# Patient Record
Sex: FEMALE | Race: BLACK OR AFRICAN AMERICAN | NOT HISPANIC OR LATINO | Employment: FULL TIME | URBAN - METROPOLITAN AREA
[De-identification: names, ages, dates, MRNs, and addresses within clinical notes are randomized per-mention and may not be internally consistent; named-entity substitution may affect disease eponyms.]

---

## 2017-08-16 ENCOUNTER — HOSPITAL ENCOUNTER (EMERGENCY)
Facility: HOSPITAL | Age: 22
Discharge: HOME | End: 2017-08-16
Attending: EMERGENCY MEDICINE
Payer: MEDICAID

## 2017-08-16 VITALS
OXYGEN SATURATION: 99 % | HEART RATE: 79 BPM | DIASTOLIC BLOOD PRESSURE: 87 MMHG | BODY MASS INDEX: 27.31 KG/M2 | RESPIRATION RATE: 18 BRPM | WEIGHT: 160 LBS | SYSTOLIC BLOOD PRESSURE: 125 MMHG | HEIGHT: 64 IN | TEMPERATURE: 98 F

## 2017-08-16 DIAGNOSIS — L01.00 IMPETIGO: Primary | ICD-10-CM

## 2017-08-16 LAB
B-HCG UR QL: NEGATIVE
CTP QC/QA: YES

## 2017-08-16 PROCEDURE — 99283 EMERGENCY DEPT VISIT LOW MDM: CPT | Mod: 25

## 2017-08-16 PROCEDURE — 81025 URINE PREGNANCY TEST: CPT | Performed by: PHYSICIAN ASSISTANT

## 2017-08-16 PROCEDURE — 25000003 PHARM REV CODE 250: Performed by: PHYSICIAN ASSISTANT

## 2017-08-16 RX ORDER — MUPIROCIN 20 MG/G
1 OINTMENT TOPICAL
Status: COMPLETED | OUTPATIENT
Start: 2017-08-16 | End: 2017-08-16

## 2017-08-16 RX ORDER — SULFAMETHOXAZOLE AND TRIMETHOPRIM 800; 160 MG/1; MG/1
2 TABLET ORAL
Status: COMPLETED | OUTPATIENT
Start: 2017-08-16 | End: 2017-08-16

## 2017-08-16 RX ORDER — SULFAMETHOXAZOLE AND TRIMETHOPRIM 800; 160 MG/1; MG/1
2 TABLET ORAL 2 TIMES DAILY
Qty: 28 TABLET | Refills: 0 | Status: SHIPPED | OUTPATIENT
Start: 2017-08-16 | End: 2017-08-23

## 2017-08-16 RX ORDER — MUPIROCIN 20 MG/G
OINTMENT TOPICAL 3 TIMES DAILY
Qty: 15 G | Refills: 0 | Status: ON HOLD | OUTPATIENT
Start: 2017-08-16 | End: 2020-11-11 | Stop reason: HOSPADM

## 2017-08-16 RX ADMIN — SULFAMETHOXAZOLE AND TRIMETHOPRIM 2 TABLET: 800; 160 TABLET ORAL at 12:08

## 2017-08-16 RX ADMIN — MUPIROCIN 22 G: 20 OINTMENT TOPICAL at 12:08

## 2017-08-16 NOTE — ED PROVIDER NOTES
Encounter Date: 2017    SCRIBE #1 NOTE: I, Gonsalo Ellis, am scribing for, and in the presence of,  Tera Brown PA-C. I have scribed the following portions of the note - Other sections scribed: HPI, ROS.       History     Chief Complaint   Patient presents with    Rash     on 4th and 5th digit left hand..  patient is a  at a restaraunt.. starting to spread to 3rd digit also.. red, discolored     CC: Rash    23 y/o female with asthma and heart murmur of  presents to the ED c/o 2 month hx of acute onset rash to 3rd and 4th digit of L hand with associated pain. The symptoms have progressively worsened. The patient states that she first noticed the symptoms to the 4th digit of L hand; however, it has recently spread to the 3rd digit of L hand. The patient states that she was wearing a ring on her 4th digit, but she removed it. The patient also reports working as a . The patient denies SOB, chest pain, fever, or chills. The patient attempted lotion with no relief. No alleviating factors or other symptoms reported.      The history is provided by the patient. No  was used.     Review of patient's allergies indicates:   Allergen Reactions    Iodine and iodide containing products Rash     Past Medical History:   Diagnosis Date    Asthma     Heart murmur of       Past Surgical History:   Procedure Laterality Date    TONSILLECTOMY      partial tionsillectomy     Family History   Problem Relation Age of Onset    Breast cancer Neg Hx     Colon cancer Neg Hx     Ovarian cancer Neg Hx      Social History   Substance Use Topics    Smoking status: Never Smoker    Smokeless tobacco: Never Used    Alcohol use Yes      Comment: ocassionally     Review of Systems   Constitutional: Negative for chills and fever.   HENT: Negative for rhinorrhea.    Eyes: Negative for redness.   Respiratory: Negative for cough and shortness of breath.    Cardiovascular: Negative for  chest pain.   Gastrointestinal: Negative for abdominal pain, diarrhea, nausea and vomiting.   Genitourinary: Negative for difficulty urinating and dysuria.   Musculoskeletal: Negative for back pain.   Skin: Positive for rash (to 3rd and 4th digit of L hand with associated pain).   Neurological: Negative for headaches.       Physical Exam     Initial Vitals [08/16/17 1116]   BP Pulse Resp Temp SpO2   124/73 76 16 97.9 °F (36.6 °C) --      MAP       90         Physical Exam    Nursing note and vitals reviewed.  Constitutional: She appears well-developed and well-nourished. She is not diaphoretic. No distress.   HENT:   Head: Normocephalic and atraumatic.   Eyes: Conjunctivae and EOM are normal. Pupils are equal, round, and reactive to light.   Neck: Normal range of motion. Neck supple. No tracheal deviation present.   Cardiovascular: Normal heart sounds.   Pulmonary/Chest: Breath sounds normal. No stridor. No respiratory distress.   Abdominal: Soft. Bowel sounds are normal. She exhibits no distension. There is no tenderness.   Musculoskeletal: Normal range of motion.        Hands:  Lymphadenopathy:     She has no cervical adenopathy.   Neurological: She is alert and oriented to person, place, and time.   Skin: Skin is warm and dry. Capillary refill takes less than 2 seconds.   Psychiatric: She has a normal mood and affect. Her behavior is normal. Judgment and thought content normal.         ED Course   Procedures  Labs Reviewed   POCT URINE PREGNANCY             Medical Decision Making:   Initial Assessment:   22-year-old female chief complaint rash to hand ×2 months.  Differential Diagnosis:   Impetigo, ALLERGIC reaction, herpes  ED Management:  Patient overall well-appearing, in no acute distress, afebrile, vitals within normal limits.    Patient states initially rash began on left fourth digit.  She states she suspects is related to a ring that she's been wearing.  She has continued to weathering, and also notes  that the rash is began to spread to third and fifth digits as well.  On exam, there is evidence of crusting, erythema, and tenderness to palpation.  Patient does state that she has various ALLERGIES to lotions, lipsticks, topical products.  I do think she may have some sort of ALLERGY to the ring that she's been wearing.  I've asked her to discontinue wearing ring.  I do suspect what may have an ALLERGY, has now progressed into bacterial infection.  I will discharge with Bactroban topically, in addition to Bactrim by mouth prescription.  I've asked her to follow with her primary care physician for reevaluation of symptoms, and return this ED if any problems occur.  She does understand and agree with treatment plan.   Other:   I have discussed this case with another health care provider.       <> Summary of the Discussion: I have discussed this case with Dr. Gallegos.            Scribe Attestation:   Scribe #1: I performed the above scribed service and the documentation accurately describes the services I performed. I attest to the accuracy of the note.    Attending Attestation:           Physician Attestation for Scribe:  Physician Attestation Statement for Scribe #1: I, Tera Brown PA-C, reviewed documentation, as scribed by Gonsalo Ellis in my presence, and it is both accurate and complete.                 ED Course     Clinical Impression:   The encounter diagnosis was Impetigo.    Disposition:   Disposition: Discharged  Condition: Stable                        Tera Herman PA-C  08/16/17 1315

## 2017-08-16 NOTE — ED TRIAGE NOTES
Rash: Patient complains of rash involving the left hand. Rash started 2 months ago. Appearance of rash at onset: Color of lesion(s): pink. Rash has changed over time Initial distribution: ring finger.  Discomfort associated with rash: causes no discomfort.  Associated symptoms: none. Denies: cough, fever, nausea, sore throat and vomiting. Patient has not had previous evaluation of rash. Patient has not had previous treatment.  Response to treatment: lotion. Patient has not had contacts with similar rash. Patient has not identified precipitant. Patient has not had new exposures (soaps, lotions, laundry detergents, foods, medications, plants, insects or animals.)

## 2017-08-16 NOTE — DISCHARGE INSTRUCTIONS
Take antibiotics as prescribed.  Use ointment inside naris and on wound 3 times daily.  Follow-up with primary care physician for reevaluation.  Return to this ED if any problems occur.

## 2019-12-02 ENCOUNTER — HOSPITAL ENCOUNTER (EMERGENCY)
Facility: HOSPITAL | Age: 24
Discharge: HOME | End: 2019-12-02
Attending: EMERGENCY MEDICINE

## 2019-12-02 VITALS
OXYGEN SATURATION: 98 % | TEMPERATURE: 99 F | BODY MASS INDEX: 30.12 KG/M2 | HEIGHT: 63 IN | SYSTOLIC BLOOD PRESSURE: 139 MMHG | DIASTOLIC BLOOD PRESSURE: 85 MMHG | WEIGHT: 170 LBS | RESPIRATION RATE: 18 BRPM | HEART RATE: 73 BPM

## 2019-12-02 DIAGNOSIS — R10.9 ABDOMINAL CRAMPING: Primary | ICD-10-CM

## 2019-12-02 LAB
B-HCG UR QL: NEGATIVE
BACTERIA #/AREA URNS HPF: ABNORMAL /HPF
BILIRUB UR QL STRIP: NEGATIVE
CLARITY UR: CLEAR
COLOR UR: YELLOW
CTP QC/QA: YES
GLUCOSE UR QL STRIP: NEGATIVE
HGB UR QL STRIP: ABNORMAL
HYALINE CASTS #/AREA URNS LPF: 1 /LPF
KETONES UR QL STRIP: NEGATIVE
LEUKOCYTE ESTERASE UR QL STRIP: NEGATIVE
MICROSCOPIC COMMENT: ABNORMAL
NITRITE UR QL STRIP: NEGATIVE
PH UR STRIP: 5 [PH] (ref 5–8)
PROT UR QL STRIP: ABNORMAL
RBC #/AREA URNS HPF: 15 /HPF (ref 0–4)
SP GR UR STRIP: >1.03 (ref 1–1.03)
URN SPEC COLLECT METH UR: ABNORMAL
UROBILINOGEN UR STRIP-ACNC: ABNORMAL EU/DL
WBC #/AREA URNS HPF: 3 /HPF (ref 0–5)

## 2019-12-02 PROCEDURE — 87186 SC STD MICRODIL/AGAR DIL: CPT

## 2019-12-02 PROCEDURE — 87086 URINE CULTURE/COLONY COUNT: CPT

## 2019-12-02 PROCEDURE — 87088 URINE BACTERIA CULTURE: CPT

## 2019-12-02 PROCEDURE — 81000 URINALYSIS NONAUTO W/SCOPE: CPT

## 2019-12-02 PROCEDURE — 81025 URINE PREGNANCY TEST: CPT | Performed by: NURSE PRACTITIONER

## 2019-12-02 PROCEDURE — 87077 CULTURE AEROBIC IDENTIFY: CPT

## 2019-12-02 PROCEDURE — 25000003 PHARM REV CODE 250: Performed by: PHYSICIAN ASSISTANT

## 2019-12-02 PROCEDURE — 99283 EMERGENCY DEPT VISIT LOW MDM: CPT | Mod: 25

## 2019-12-02 PROCEDURE — 87491 CHLMYD TRACH DNA AMP PROBE: CPT

## 2019-12-02 RX ORDER — IBUPROFEN 600 MG/1
600 TABLET ORAL
Status: COMPLETED | OUTPATIENT
Start: 2019-12-02 | End: 2019-12-02

## 2019-12-02 RX ORDER — IBUPROFEN 600 MG/1
600 TABLET ORAL EVERY 6 HOURS PRN
Qty: 20 TABLET | Refills: 0 | Status: ON HOLD | OUTPATIENT
Start: 2019-12-02 | End: 2020-11-11 | Stop reason: SDUPTHER

## 2019-12-02 RX ADMIN — IBUPROFEN 600 MG: 600 TABLET, FILM COATED ORAL at 09:12

## 2019-12-02 NOTE — LETTER
12/07/2019      Zaheer Serna  [address]  [place] [address]      Visit Date: 12/2/2019        Multiple attempts to contact you via the provided telephone have been unsuccessful. This written correspondence is to inform you that your test results from your recent visit to [facility] have abnormal results. Please return to the Emergency Department immediately or call [phone] and ask to speak with a provider for further information.        Thank you,  Emergency Department Staff  [facility] [address]  ?  [place] [address]  ?  phone [phone] ?   ?  fax [phone]  ?  [email or web address]

## 2019-12-03 LAB
C TRACH DNA SPEC QL NAA+PROBE: NOT DETECTED
N GONORRHOEA DNA SPEC QL NAA+PROBE: NOT DETECTED

## 2019-12-03 NOTE — ED PROVIDER NOTES
Encounter Date: 2019    SCRIBE #1 NOTE: I, Barb Peña, am scribing for, and in the presence of,  Mitchell Nguyen PA-C. I have scribed the following portions of the note - Other sections scribed: HPI, ROS, and PE.       History     Chief Complaint   Patient presents with    Abdominal Cramping     Pt here with c/o lower abdominal cramping x a few days, Pt reports last menstral cycle 10/25/19. Pt did not take UPT at home.     CC: Abdominal Pain    HPI:  This is a 24 y.o. female with a PMHx of Asthma. She presents to the Emergency Department with a cc of cramping, suprapubic, abdominal pain x4 days. The patient states that her menstrual period is late; she has a Hx of irregular periods but states this one is more irregular than usual. Now in the ED she is asymptomatic. She denies any vaginal bleeding, vaginal discharge, dysuria, or fever. There were no alleviating or worsening factors reported. Patient reports no prior history of similar symptoms. She is allergic to Iodine. Patient has no concern for STD/STI.        The history is provided by the patient.     Review of patient's allergies indicates:   Allergen Reactions    Iodine and iodide containing products Rash     Past Medical History:   Diagnosis Date    Asthma     Heart murmur of       Past Surgical History:   Procedure Laterality Date    TONSILLECTOMY      partial tionsillectomy     Family History   Problem Relation Age of Onset    Breast cancer Neg Hx     Colon cancer Neg Hx     Ovarian cancer Neg Hx      Social History     Tobacco Use    Smoking status: Never Smoker    Smokeless tobacco: Never Used   Substance Use Topics    Alcohol use: Yes     Comment: patient drinks every day sara and daqauri    Drug use: Yes     Types: Marijuana     Review of Systems   Constitutional: Negative for chills and fever.   HENT: Negative for congestion and sore throat.    Eyes: Negative for visual disturbance.   Respiratory: Negative for cough and  shortness of breath.    Cardiovascular: Negative for chest pain.   Gastrointestinal: Positive for abdominal pain. Negative for diarrhea, nausea and vomiting.   Genitourinary: Negative for dysuria, hematuria, vaginal bleeding and vaginal discharge.   Musculoskeletal: Negative for back pain and neck pain.   Skin: Negative for rash.   Neurological: Negative for headaches.   Psychiatric/Behavioral: Negative for confusion.       Physical Exam     Initial Vitals [12/02/19 1916]   BP Pulse Resp Temp SpO2   (!) 149/92 76 18 98.3 °F (36.8 °C) 99 %      MAP       --         Physical Exam    Nursing note and vitals reviewed.  Constitutional: She appears well-developed and well-nourished. She is not diaphoretic. No distress.   HENT:   Head: Normocephalic.   Mouth/Throat: Oropharynx is clear and moist. No oropharyngeal exudate.   Eyes: Conjunctivae and EOM are normal. Pupils are equal, round, and reactive to light.   Neck: Normal range of motion. Neck supple.   Cardiovascular: Normal rate, regular rhythm and normal heart sounds.   Pulmonary/Chest: No respiratory distress.   Abdominal: Soft. Bowel sounds are normal. She exhibits no distension. There is no tenderness. There is no rebound and no guarding.   Musculoskeletal: Normal range of motion.   Neurological: She is alert and oriented to person, place, and time. She has normal strength and normal reflexes. GCS score is 15. GCS eye subscore is 4. GCS verbal subscore is 5. GCS motor subscore is 6.   Skin: Skin is warm and dry. Capillary refill takes less than 2 seconds.   Psychiatric: She has a normal mood and affect.         ED Course   Procedures  Labs Reviewed   URINALYSIS, REFLEX TO URINE CULTURE - Abnormal; Notable for the following components:       Result Value    Specific Gravity, UA >1.030 (*)     Protein, UA 1+ (*)     Occult Blood UA 3+ (*)     Urobilinogen, UA 4.0-6.0 (*)     All other components within normal limits    Narrative:     Preferred Collection Type->Urine,  Clean Catch   URINALYSIS MICROSCOPIC - Abnormal; Notable for the following components:    RBC, UA 15 (*)     Bacteria Moderate (*)     All other components within normal limits    Narrative:     Preferred Collection Type->Urine, Clean Catch   CULTURE, URINE   C. TRACHOMATIS/N. GONORRHOEAE BY AMP DNA   POCT URINE PREGNANCY          Imaging Results    None          Medical Decision Making:   ED Management:  24-year-old female with intermittent lower abdominal cramping and concern for pregnancy.  UPT is negative. Abdomen soft and nontender. Very low suspicion for PID or ectopic pregnancy.  UA without convincing evidence for infection.  Will culture.  Patient treated with ibuprofen and discharged with return precautions and follow-up instructions.            Scribe Attestation:   Scribe #1: I performed the above scribed service and the documentation accurately describes the services I performed. I attest to the accuracy of the note.                          Clinical Impression:     1. Abdominal cramping          Disposition:   Disposition: Discharged  Condition: Stable        Scribe attestation: I, Mitchell Nguyen, personally performed the services described in this documentation. All medical record entries made by the scribe were at my direction and in my presence.  I have reviewed the chart and agree that the record reflects my personal performance and is accurate and complete.               Mitchell Nguyen PA-C  12/02/19 4440

## 2019-12-04 LAB — BACTERIA UR CULT: ABNORMAL

## 2020-02-08 ENCOUNTER — HOSPITAL ENCOUNTER (EMERGENCY)
Facility: HOSPITAL | Age: 25
Discharge: HOME | End: 2020-02-08
Attending: EMERGENCY MEDICINE
Payer: MEDICAID

## 2020-02-08 VITALS
HEIGHT: 64 IN | TEMPERATURE: 98 F | HEART RATE: 82 BPM | DIASTOLIC BLOOD PRESSURE: 80 MMHG | RESPIRATION RATE: 18 BRPM | BODY MASS INDEX: 29.02 KG/M2 | OXYGEN SATURATION: 98 % | SYSTOLIC BLOOD PRESSURE: 127 MMHG | WEIGHT: 170 LBS

## 2020-02-08 DIAGNOSIS — R30.0 DYSURIA: Primary | ICD-10-CM

## 2020-02-08 DIAGNOSIS — N30.01 ACUTE CYSTITIS WITH HEMATURIA: ICD-10-CM

## 2020-02-08 LAB
B-HCG UR QL: NEGATIVE
BACTERIA #/AREA URNS HPF: ABNORMAL /HPF
BACTERIA GENITAL QL WET PREP: ABNORMAL
BILIRUB UR QL STRIP: NEGATIVE
CLARITY UR: ABNORMAL
CLUE CELLS VAG QL WET PREP: ABNORMAL
COLOR UR: YELLOW
CTP QC/QA: YES
FILAMENT FUNGI VAG WET PREP-#/AREA: ABNORMAL
GLUCOSE UR QL STRIP: NEGATIVE
HGB UR QL STRIP: ABNORMAL
HYALINE CASTS #/AREA URNS LPF: 0 /LPF
KETONES UR QL STRIP: NEGATIVE
LEUKOCYTE ESTERASE UR QL STRIP: NEGATIVE
MICROSCOPIC COMMENT: ABNORMAL
NITRITE UR QL STRIP: NEGATIVE
PH UR STRIP: 6 [PH] (ref 5–8)
PROT UR QL STRIP: ABNORMAL
RBC #/AREA URNS HPF: >100 /HPF (ref 0–4)
SP GR UR STRIP: 1.02 (ref 1–1.03)
SPECIMEN SOURCE: ABNORMAL
SQUAMOUS #/AREA URNS HPF: 10 /HPF
T VAGINALIS GENITAL QL WET PREP: ABNORMAL
URN SPEC COLLECT METH UR: ABNORMAL
UROBILINOGEN UR STRIP-ACNC: NEGATIVE EU/DL
WBC #/AREA URNS HPF: 2 /HPF (ref 0–5)
WBC #/AREA VAG WET PREP: ABNORMAL
YEAST GENITAL QL WET PREP: ABNORMAL

## 2020-02-08 PROCEDURE — 99283 EMERGENCY DEPT VISIT LOW MDM: CPT

## 2020-02-08 PROCEDURE — 87210 SMEAR WET MOUNT SALINE/INK: CPT

## 2020-02-08 PROCEDURE — 81000 URINALYSIS NONAUTO W/SCOPE: CPT

## 2020-02-08 PROCEDURE — 87591 N.GONORRHOEAE DNA AMP PROB: CPT

## 2020-02-08 PROCEDURE — 81025 URINE PREGNANCY TEST: CPT | Performed by: PHYSICIAN ASSISTANT

## 2020-02-08 RX ORDER — CEPHALEXIN 500 MG/1
500 CAPSULE ORAL 2 TIMES DAILY
Qty: 10 CAPSULE | Refills: 0 | Status: SHIPPED | OUTPATIENT
Start: 2020-02-08 | End: 2020-02-13

## 2020-02-08 NOTE — DISCHARGE INSTRUCTIONS
Take Keflex twice daily for 5 days for treatment of UTI.  Follow-up with your OBGYN if symptoms do not improve.  Return to the ED for any concerning symptoms.    Our goal in the emergency department is to always give you outstanding care and exceptional service. You may receive a survey by mail or e-mail in the next week regarding your experience in our ED. We would greatly appreciate your completing and returning the survey. Your feedback provides us with a way to recognize our staff who give very good care and it helps us learn how to improve when your experience was below our aspiration of excellence.

## 2020-02-08 NOTE — ED TRIAGE NOTES
"Pt. Reports abd pain and lower back pain. Pt. States she has " pressure" with urinating and feels like she is not emptying her bladder. Pt. Denies any concerns for STD or vaginal discharge.   "

## 2020-02-08 NOTE — ED PROVIDER NOTES
Encounter Date: 2020    SCRIBE #1 NOTE: I, Marcela Zaldivar, am scribing for, and in the presence of,  Cynthia Prabhakar PA-C. I have scribed the following portions of the note - Other sections scribed: HPI, ROS, PE.       History     Chief Complaint   Patient presents with    Abdominal Pain     x wk lower back and lower abd pain, frequency; denies dysuria,      This is a 24 y.o. female with no pertinent PMHx who presents to the Emergency Department with a cc of constant lower abdominal pain x1 week. Pt reports associated back pain, frequency, decrease in appetite, and hematuria (resolved). Pt denies any emesis, diarrhea, or vaginal discharge. Patient states that she is currently on her menstrual cycle. She states she would like to be screened for STDs.    The history is provided by the patient. No  was used.     Review of patient's allergies indicates:   Allergen Reactions    Iodine and iodide containing products Rash     Past Medical History:   Diagnosis Date    Asthma     Heart murmur of       Past Surgical History:   Procedure Laterality Date    TONSILLECTOMY      partial tionsillectomy     Family History   Problem Relation Age of Onset    Breast cancer Neg Hx     Colon cancer Neg Hx     Ovarian cancer Neg Hx      Social History     Tobacco Use    Smoking status: Never Smoker    Smokeless tobacco: Never Used   Substance Use Topics    Alcohol use: Yes     Comment: patient drinks every day sara and daqauri    Drug use: Yes     Types: Marijuana     Review of Systems   Constitutional: Positive for appetite change. Negative for chills and fever.   HENT: Negative for sore throat.    Eyes: Negative for visual disturbance.   Respiratory: Positive for shortness of breath.    Cardiovascular: Positive for chest pain.   Gastrointestinal: Positive for abdominal pain. Negative for diarrhea, nausea and vomiting.   Genitourinary: Positive for frequency and hematuria (resolved).  Negative for dysuria, vaginal bleeding and vaginal discharge.   Musculoskeletal: Positive for back pain. Negative for neck pain.   Skin: Negative for rash.   Allergic/Immunologic: Negative for immunocompromised state.   Neurological: Negative for headaches.   Psychiatric/Behavioral: Negative for dysphoric mood.       Physical Exam     Initial Vitals [02/08/20 1517]   BP Pulse Resp Temp SpO2   (!) 141/94 79 18 98.3 °F (36.8 °C) 100 %      MAP       --         Physical Exam    Nursing note and vitals reviewed.  Constitutional: She appears well-developed and well-nourished. She is not diaphoretic. No distress.   HENT:   Head: Normocephalic and atraumatic.   Mouth/Throat: Oropharynx is clear and moist. No oropharyngeal exudate.   Eyes: Conjunctivae and EOM are normal. Pupils are equal, round, and reactive to light.   Neck: Normal range of motion. Neck supple.   Cardiovascular: Normal rate, regular rhythm, normal heart sounds and intact distal pulses.   Pulmonary/Chest: Breath sounds normal. No respiratory distress. She has no wheezes. She has no rhonchi. She has no rales.   Abdominal: Soft. Bowel sounds are normal. There is tenderness. There is no rebound and no guarding.   Abdomen soft, nondistended, with mild TTP to the suprapubic region.   Genitourinary:   Genitourinary Comments: Pelvic exam (RN chaperoned):  Normal external genitalia.  Normal vaginal rugae with bright red blood noted to the posterior vault.  Unable to fully visualize the cervix.  No abnormal discharge noted. Cervix pink and nonfriable.  No CMT or adnexal tenderness.   Musculoskeletal: Normal range of motion.   Lymphadenopathy:     She has no cervical adenopathy.   Neurological: She is alert and oriented to person, place, and time. GCS score is 15. GCS eye subscore is 4. GCS verbal subscore is 5. GCS motor subscore is 6.   Skin: Skin is warm and dry. Capillary refill takes less than 2 seconds.   Psychiatric: She has a normal mood and affect. Thought  content normal.         ED Course   Procedures  Labs Reviewed   URINALYSIS, REFLEX TO URINE CULTURE - Abnormal; Notable for the following components:       Result Value    Appearance, UA Hazy (*)     Protein, UA 2+ (*)     Occult Blood UA 3+ (*)     All other components within normal limits    Narrative:     Preferred Collection Type->Urine, Clean Catch   VAGINAL SCREEN - Abnormal; Notable for the following components:    WBC - Vaginal Screen Few (*)     Bacteria - Vaginal Screen Few (*)     All other components within normal limits   URINALYSIS MICROSCOPIC - Abnormal; Notable for the following components:    RBC, UA >100 (*)     Bacteria Few (*)     All other components within normal limits    Narrative:     Preferred Collection Type->Urine, Clean Catch   C. TRACHOMATIS/N. GONORRHOEAE BY AMP DNA   POCT URINE PREGNANCY          Imaging Results    None                APC / Resident Notes:   This is an evaluation of a 24 y.o. female with asthma that presents to the Emergency Department for dysuria. The patient is a non-toxic, afebrile, and well appearing female. On physical exam, there is mild suprapubic TTP, no CVAT, pelvic exam largely unremarkable; she has no abdominal tenderness, distention, peritoneal signs, or CVA tenderness.     Vital Signs: WNL  Lab\Radiology\Other Procedure RESULTS: UA with proteinuria, 3+ OB, neg nitrites/leuks, >100 RBCs, few bacteria; Urine Culture is pending. Wet prep with few bacteria.    Given the above findings, my overall impression is UTI. Given the above findings, I do not think the patient has pyelonephritis or STI .    Upon chart review, patient's urine culture from 12/2019 pan-sensitive. The patient will be discharged home with Keflex. The diagnosis, treatment plan, instructions for follow-up and reevaluation with her PCP as well as ED return precautions have been discussed with the patient and she has verbalized an understanding of the information. All questions or concerns from  the patient have been addressed.        Scribe Attestation:   Scribe #1: I performed the above scribed service and the documentation accurately describes the services I performed. I attest to the accuracy of the note.                          Clinical Impression:       ICD-10-CM ICD-9-CM   1. Dysuria R30.0 788.1   2. Acute cystitis with hematuria N30.01 595.0         Disposition:   Disposition: Discharged  Condition: Stable    Scribe attestation: I, Cynthia Prabhakar, personally performed the services described in this documentation. All medical record entries made by the scribe were at my direction and in my presence.  I have reviewed the chart and agree that the record reflects my personal performance and is accurate and complete.                   Cynthia Prabhakar PA-C  02/08/20 8870

## 2020-02-10 LAB
C TRACH DNA SPEC QL NAA+PROBE: NOT DETECTED
N GONORRHOEA DNA SPEC QL NAA+PROBE: NOT DETECTED

## 2020-09-21 ENCOUNTER — HOSPITAL ENCOUNTER (OUTPATIENT)
Dept: OBSTETRICS AND GYNECOLOGY | Facility: HOSPITAL | Age: 25
Discharge: HOME | End: 2020-09-21
Attending: OBSTETRICS & GYNECOLOGY
Payer: MEDICAID

## 2020-09-21 DIAGNOSIS — Z34.82 PRENATAL CARE, SUBSEQUENT PREGNANCY, SECOND TRIMESTER: ICD-10-CM

## 2020-09-21 LAB — INJECT RH IG VOL PATIENT: NORMAL ML

## 2020-09-21 PROCEDURE — 63600519 RHOGAM PHARM REV CODE 636 ALT 250 W HCPCS: Performed by: OBSTETRICS & GYNECOLOGY

## 2020-09-21 RX ADMIN — HUMAN RHO(D) IMMUNE GLOBULIN 300 MCG: 300 INJECTION, SOLUTION INTRAMUSCULAR at 05:09

## 2020-10-31 ENCOUNTER — HOSPITAL ENCOUNTER (OUTPATIENT)
Facility: HOSPITAL | Age: 25
Discharge: HOME | End: 2020-10-31
Attending: OBSTETRICS & GYNECOLOGY | Admitting: OBSTETRICS & GYNECOLOGY
Payer: MEDICAID

## 2020-10-31 VITALS
SYSTOLIC BLOOD PRESSURE: 131 MMHG | RESPIRATION RATE: 18 BRPM | TEMPERATURE: 98 F | HEART RATE: 79 BPM | OXYGEN SATURATION: 98 % | DIASTOLIC BLOOD PRESSURE: 86 MMHG

## 2020-10-31 DIAGNOSIS — R10.9 ABDOMINAL PAIN: ICD-10-CM

## 2020-10-31 LAB
BACTERIA #/AREA URNS HPF: ABNORMAL /HPF
BILIRUB UR QL STRIP: NEGATIVE
CLARITY UR: ABNORMAL
COLOR UR: ABNORMAL
GLUCOSE UR QL STRIP: NEGATIVE
HGB UR QL STRIP: NEGATIVE
HYALINE CASTS #/AREA URNS LPF: ABNORMAL /LPF
KETONES UR QL STRIP: ABNORMAL
LEUKOCYTE ESTERASE UR QL STRIP: ABNORMAL
MICROSCOPIC COMMENT: ABNORMAL
NITRITE UR QL STRIP: NEGATIVE
PH UR STRIP: 6 [PH] (ref 5–8)
PROT UR QL STRIP: ABNORMAL
RBC #/AREA URNS HPF: 0 /HPF (ref 0–4)
SP GR UR STRIP: 1.02 (ref 1–1.03)
SQUAMOUS #/AREA URNS HPF: ABNORMAL /HPF
URN SPEC COLLECT METH UR: ABNORMAL
UROBILINOGEN UR STRIP-ACNC: ABNORMAL EU/DL
WBC #/AREA URNS HPF: 15 /HPF (ref 0–5)

## 2020-10-31 PROCEDURE — 81000 URINALYSIS NONAUTO W/SCOPE: CPT

## 2020-10-31 PROCEDURE — 59025 FETAL NON-STRESS TEST: CPT

## 2020-10-31 PROCEDURE — 99211 OFF/OP EST MAY X REQ PHY/QHP: CPT | Mod: 25

## 2020-11-01 NOTE — DISCHARGE INSTRUCTIONS
Please  call Labor and delivery with questions or concerns at [phone] and keep your schedules appointment.

## 2020-11-01 NOTE — PROGRESS NOTES
Spoke with Dr Stewart at this time regarding patient's c/o vaginal pressure and cramping and informed him about her reassuring strip and the results of her urinalysis received orders to discharge patient home.

## 2020-11-01 NOTE — NURSING
Patient present to L&D with a complaint of cramping and vaginal pressure that began last night after having sex.  Patient denies vaginal bleeding or ROM.  Upon SVE assessment patient is 2 cm dilated, very posterior and thick.  No bleeding noted after SVE.   Patient placed on EFM and given po hydration at this time.  Clean catch urine collected and sent to lab.

## 2020-11-09 NOTE — H&P (VIEW-ONLY)
25 y.o.  presents for pre-op H&P for IOL for elective purposes.  Patient's last menstrual period was 2020..  Patient will be 39 weeks on date of IOL.  Cervix is very favorable.     Past Medical History:   Diagnosis Date    Asthma     Heart murmur of       Past Surgical History:   Procedure Laterality Date    TONSILLECTOMY      partial tionsillectomy     Family History   Problem Relation Age of Onset    Breast cancer Neg Hx     Colon cancer Neg Hx     Ovarian cancer Neg Hx      Review of patient's allergies indicates:   Allergen Reactions    Iodine and iodide containing products Rash       Current Outpatient Medications:     ibuprofen (ADVIL,MOTRIN) 600 MG tablet, Take 1 tablet (600 mg total) by mouth every 6 (six) hours as needed for Pain., Disp: 20 tablet, Rfl: 0    mupirocin (BACTROBAN) 2 % ointment, Apply topically 3 (three) times daily. Apply to wound and inside nares TID., Disp: 15 g, Rfl: 0  Social History     Socioeconomic History    Marital status: Single     Spouse name: Not on file    Number of children: Not on file    Years of education: Not on file    Highest education level: Not on file   Occupational History    Not on file   Social Needs    Financial resource strain: Not on file    Food insecurity     Worry: Not on file     Inability: Not on file    Transportation needs     Medical: Not on file     Non-medical: Not on file   Tobacco Use    Smoking status: Never Smoker    Smokeless tobacco: Never Used   Substance and Sexual Activity    Alcohol use: Yes     Comment: patient drinks every day sara and daqauri    Drug use: Yes     Types: Marijuana    Sexual activity: Yes   Lifestyle    Physical activity     Days per week: Not on file     Minutes per session: Not on file    Stress: Not on file   Relationships    Social connections     Talks on phone: Not on file     Gets together: Not on file     Attends Church service: Not on file     Active member of  club or organization: Not on file     Attends meetings of clubs or organizations: Not on file     Relationship status: Not on file   Other Topics Concern    Not on file   Social History Narrative    Not on file         There were no vitals filed for this visit.  General Appearance: Alert, appropriate appearance for age. No acute distress, Chest/Respiratory Exam: normal, CTA  Cardiovascular Exam: RRR  Gastrointestinal Exam: soft, NT/ND  Pelvic Exam Female: 4/80/-2 VTX   Psychiatric Exam: Alert and oriented, appropriate affect.    Assessment: IUP at 39 weeks / IOL / GBS+  Plan: pitocin for IOL.  Ampicillin  I have discussed the risks, benefits, indications, and alternatives of the procedure in detail.  The patient verbalizes her understanding.  All questions answered.  Consents signed.  The patient agrees to proceed to proceed as planned.    Fritz Stewart MD

## 2020-11-10 ENCOUNTER — HOSPITAL ENCOUNTER (INPATIENT)
Facility: HOSPITAL | Age: 25
LOS: 2 days | Discharge: HOME | End: 2020-11-12
Attending: OBSTETRICS & GYNECOLOGY | Admitting: OBSTETRICS & GYNECOLOGY
Payer: MEDICAID

## 2020-11-10 ENCOUNTER — ANESTHESIA EVENT (OUTPATIENT)
Dept: OBSTETRICS AND GYNECOLOGY | Facility: HOSPITAL | Age: 25
End: 2020-11-10
Payer: MEDICAID

## 2020-11-10 ENCOUNTER — ANESTHESIA (OUTPATIENT)
Dept: OBSTETRICS AND GYNECOLOGY | Facility: HOSPITAL | Age: 25
End: 2020-11-10
Payer: MEDICAID

## 2020-11-10 DIAGNOSIS — O26.893 RH NEGATIVE STATUS DURING PREGNANCY IN THIRD TRIMESTER: ICD-10-CM

## 2020-11-10 DIAGNOSIS — Z34.90 ENCOUNTER FOR ELECTIVE INDUCTION OF LABOR: ICD-10-CM

## 2020-11-10 DIAGNOSIS — Z67.91 RH NEGATIVE STATUS DURING PREGNANCY IN THIRD TRIMESTER: ICD-10-CM

## 2020-11-10 DIAGNOSIS — Z34.90 PREGNANCY: ICD-10-CM

## 2020-11-10 LAB
ABO + RH BLD: NORMAL
BASOPHILS # BLD AUTO: 0.02 K/UL (ref 0–0.2)
BASOPHILS NFR BLD: 0.3 % (ref 0–1.9)
BLD GP AB SCN CELLS X3 SERPL QL: NORMAL
BLOOD GROUP ANTIBODIES SERPL: NORMAL
DIFFERENTIAL METHOD: ABNORMAL
EOSINOPHIL # BLD AUTO: 0.1 K/UL (ref 0–0.5)
EOSINOPHIL NFR BLD: 1 % (ref 0–8)
ERYTHROCYTE [DISTWIDTH] IN BLOOD BY AUTOMATED COUNT: 13.2 % (ref 11.5–14.5)
HCT VFR BLD AUTO: 35.9 % (ref 37–48.5)
HGB BLD-MCNC: 11.8 G/DL (ref 12–16)
IMM GRANULOCYTES # BLD AUTO: 0.02 K/UL (ref 0–0.04)
IMM GRANULOCYTES NFR BLD AUTO: 0.3 % (ref 0–0.5)
LYMPHOCYTES # BLD AUTO: 1.8 K/UL (ref 1–4.8)
LYMPHOCYTES NFR BLD: 25.8 % (ref 18–48)
MCH RBC QN AUTO: 29.9 PG (ref 27–31)
MCHC RBC AUTO-ENTMCNC: 32.9 G/DL (ref 32–36)
MCV RBC AUTO: 91 FL (ref 82–98)
MONOCYTES # BLD AUTO: 0.6 K/UL (ref 0.3–1)
MONOCYTES NFR BLD: 8 % (ref 4–15)
NEUTROPHILS # BLD AUTO: 4.5 K/UL (ref 1.8–7.7)
NEUTROPHILS NFR BLD: 64.6 % (ref 38–73)
NRBC BLD-RTO: 0 /100 WBC
PLATELET # BLD AUTO: 199 K/UL (ref 150–350)
PMV BLD AUTO: 11.9 FL (ref 9.2–12.9)
RBC # BLD AUTO: 3.94 M/UL (ref 4–5.4)
SARS-COV-2 RDRP RESP QL NAA+PROBE: NEGATIVE
WBC # BLD AUTO: 6.98 K/UL (ref 3.9–12.7)

## 2020-11-10 PROCEDURE — 62326 NJX INTERLAMINAR LMBR/SAC: CPT | Performed by: ANESTHESIOLOGY

## 2020-11-10 PROCEDURE — 27200710 HC EPIDURAL INFUSION PUMP SET: Performed by: ANESTHESIOLOGY

## 2020-11-10 PROCEDURE — 86850 RBC ANTIBODY SCREEN: CPT

## 2020-11-10 PROCEDURE — 86870 RBC ANTIBODY IDENTIFICATION: CPT

## 2020-11-10 PROCEDURE — 11000001 HC ACUTE MED/SURG PRIVATE ROOM

## 2020-11-10 PROCEDURE — 63600175 PHARM REV CODE 636 W HCPCS: Performed by: OBSTETRICS & GYNECOLOGY

## 2020-11-10 PROCEDURE — 59409 OBSTETRICAL CARE: CPT | Mod: AA,,, | Performed by: ANESTHESIOLOGY

## 2020-11-10 PROCEDURE — U0002 COVID-19 LAB TEST NON-CDC: HCPCS

## 2020-11-10 PROCEDURE — 25000003 PHARM REV CODE 250: Performed by: ANESTHESIOLOGY

## 2020-11-10 PROCEDURE — 25000003 PHARM REV CODE 250: Performed by: OBSTETRICS & GYNECOLOGY

## 2020-11-10 PROCEDURE — C1751 CATH, INF, PER/CENT/MIDLINE: HCPCS | Performed by: ANESTHESIOLOGY

## 2020-11-10 PROCEDURE — 85025 COMPLETE CBC W/AUTO DIFF WBC: CPT

## 2020-11-10 PROCEDURE — 72100002 HC LABOR CARE, 1ST 8 HOURS

## 2020-11-10 PROCEDURE — 72200006 HC VAGINAL DELIVERY LEVEL III

## 2020-11-10 PROCEDURE — 59409 PRA ETRICAL CARE,VAG DELIV ONLY: ICD-10-PCS | Mod: AA,,, | Performed by: ANESTHESIOLOGY

## 2020-11-10 RX ORDER — DOCUSATE SODIUM 100 MG/1
200 CAPSULE, LIQUID FILLED ORAL 2 TIMES DAILY PRN
Status: DISCONTINUED | OUTPATIENT
Start: 2020-11-10 | End: 2020-11-12 | Stop reason: HOSPADM

## 2020-11-10 RX ORDER — OXYTOCIN/RINGER'S LACTATE 30/500 ML
95 PLASTIC BAG, INJECTION (ML) INTRAVENOUS ONCE
Status: DISCONTINUED | OUTPATIENT
Start: 2020-11-10 | End: 2020-11-12 | Stop reason: HOSPADM

## 2020-11-10 RX ORDER — OXYTOCIN/RINGER'S LACTATE 30/500 ML
2 PLASTIC BAG, INJECTION (ML) INTRAVENOUS CONTINUOUS
Status: DISCONTINUED | OUTPATIENT
Start: 2020-11-10 | End: 2020-11-10

## 2020-11-10 RX ORDER — ONDANSETRON 8 MG/1
8 TABLET, ORALLY DISINTEGRATING ORAL EVERY 8 HOURS PRN
Status: DISCONTINUED | OUTPATIENT
Start: 2020-11-10 | End: 2020-11-10

## 2020-11-10 RX ORDER — ACETAMINOPHEN 325 MG/1
650 TABLET ORAL EVERY 6 HOURS PRN
Status: DISCONTINUED | OUTPATIENT
Start: 2020-11-10 | End: 2020-11-12 | Stop reason: HOSPADM

## 2020-11-10 RX ORDER — SIMETHICONE 80 MG
1 TABLET,CHEWABLE ORAL 4 TIMES DAILY PRN
Status: DISCONTINUED | OUTPATIENT
Start: 2020-11-10 | End: 2020-11-10

## 2020-11-10 RX ORDER — LIDOCAINE HYDROCHLORIDE AND EPINEPHRINE 15; 5 MG/ML; UG/ML
INJECTION, SOLUTION EPIDURAL
Status: DISCONTINUED | OUTPATIENT
Start: 2020-11-10 | End: 2020-11-10

## 2020-11-10 RX ORDER — DIPHENHYDRAMINE HCL 25 MG
25 CAPSULE ORAL EVERY 4 HOURS PRN
Status: DISCONTINUED | OUTPATIENT
Start: 2020-11-10 | End: 2020-11-12 | Stop reason: HOSPADM

## 2020-11-10 RX ORDER — PRENATAL WITH FERROUS FUM AND FOLIC ACID 3080; 920; 120; 400; 22; 1.84; 3; 20; 10; 1; 12; 200; 27; 25; 2 [IU]/1; [IU]/1; MG/1; [IU]/1; MG/1; MG/1; MG/1; MG/1; MG/1; MG/1; UG/1; MG/1; MG/1; MG/1; MG/1
1 TABLET ORAL DAILY
Status: DISCONTINUED | OUTPATIENT
Start: 2020-11-10 | End: 2020-11-12 | Stop reason: HOSPADM

## 2020-11-10 RX ORDER — CALCIUM CARBONATE 200(500)MG
500 TABLET,CHEWABLE ORAL 3 TIMES DAILY PRN
Status: DISCONTINUED | OUTPATIENT
Start: 2020-11-10 | End: 2020-11-10

## 2020-11-10 RX ORDER — ONDANSETRON 8 MG/1
8 TABLET, ORALLY DISINTEGRATING ORAL EVERY 8 HOURS PRN
Status: DISCONTINUED | OUTPATIENT
Start: 2020-11-10 | End: 2020-11-12 | Stop reason: HOSPADM

## 2020-11-10 RX ORDER — IBUPROFEN 600 MG/1
600 TABLET ORAL EVERY 6 HOURS PRN
Status: DISCONTINUED | OUTPATIENT
Start: 2020-11-10 | End: 2020-11-12 | Stop reason: HOSPADM

## 2020-11-10 RX ORDER — SODIUM CHLORIDE, SODIUM LACTATE, POTASSIUM CHLORIDE, CALCIUM CHLORIDE 600; 310; 30; 20 MG/100ML; MG/100ML; MG/100ML; MG/100ML
INJECTION, SOLUTION INTRAVENOUS CONTINUOUS
Status: DISCONTINUED | OUTPATIENT
Start: 2020-11-10 | End: 2020-11-10

## 2020-11-10 RX ORDER — SIMETHICONE 80 MG
1 TABLET,CHEWABLE ORAL EVERY 6 HOURS PRN
Status: DISCONTINUED | OUTPATIENT
Start: 2020-11-10 | End: 2020-11-12 | Stop reason: HOSPADM

## 2020-11-10 RX ORDER — FENTANYL/BUPIVACAINE/NS/PF 2MCG/ML-.1
PLASTIC BAG, INJECTION (ML) INJECTION CONTINUOUS PRN
Status: DISCONTINUED | OUTPATIENT
Start: 2020-11-10 | End: 2020-11-10

## 2020-11-10 RX ORDER — DIPHENHYDRAMINE HYDROCHLORIDE 50 MG/ML
25 INJECTION INTRAMUSCULAR; INTRAVENOUS EVERY 4 HOURS PRN
Status: DISCONTINUED | OUTPATIENT
Start: 2020-11-10 | End: 2020-11-12 | Stop reason: HOSPADM

## 2020-11-10 RX ORDER — OXYCODONE AND ACETAMINOPHEN 5; 325 MG/1; MG/1
1 TABLET ORAL EVERY 4 HOURS PRN
Status: DISCONTINUED | OUTPATIENT
Start: 2020-11-10 | End: 2020-11-12 | Stop reason: HOSPADM

## 2020-11-10 RX ORDER — OXYCODONE AND ACETAMINOPHEN 10; 325 MG/1; MG/1
1 TABLET ORAL EVERY 4 HOURS PRN
Status: DISCONTINUED | OUTPATIENT
Start: 2020-11-10 | End: 2020-11-12 | Stop reason: HOSPADM

## 2020-11-10 RX ADMIN — IBUPROFEN 600 MG: 600 TABLET, FILM COATED ORAL at 05:11

## 2020-11-10 RX ADMIN — IBUPROFEN 600 MG: 600 TABLET, FILM COATED ORAL at 11:11

## 2020-11-10 RX ADMIN — Medication 10 ML/HR: at 08:11

## 2020-11-10 RX ADMIN — SODIUM CHLORIDE, SODIUM LACTATE, POTASSIUM CHLORIDE, AND CALCIUM CHLORIDE 1000 ML: .6; .31; .03; .02 INJECTION, SOLUTION INTRAVENOUS at 06:11

## 2020-11-10 RX ADMIN — OXYCODONE HYDROCHLORIDE AND ACETAMINOPHEN 1 TABLET: 10; 325 TABLET ORAL at 08:11

## 2020-11-10 RX ADMIN — LIDOCAINE HYDROCHLORIDE,EPINEPHRINE BITARTRATE 3 ML: 15; .005 INJECTION, SOLUTION EPIDURAL; INFILTRATION; INTRACAUDAL; PERINEURAL at 08:11

## 2020-11-10 RX ADMIN — LIDOCAINE HYDROCHLORIDE,EPINEPHRINE BITARTRATE 2 ML: 15; .005 INJECTION, SOLUTION EPIDURAL; INFILTRATION; INTRACAUDAL; PERINEURAL at 08:11

## 2020-11-10 RX ADMIN — Medication 3 ML: at 08:11

## 2020-11-10 RX ADMIN — OXYCODONE HYDROCHLORIDE AND ACETAMINOPHEN 1 TABLET: 5; 325 TABLET ORAL at 01:11

## 2020-11-10 NOTE — PROGRESS NOTES
Received report from Jayashree ALVAREZ RN who assisted with patient transport to room 241 in [facility]. Patient is due to void.

## 2020-11-10 NOTE — ANESTHESIA PROCEDURE NOTES
Epidural    Patient location during procedure: OB   Reason for block: primary anesthetic   Diagnosis: IUP   Start time: 11/10/2020 7:50 AM  Timeout: 11/10/2020 7:50 AM  End time: 11/10/2020 8:10 AM    Staffing  Performing Provider: Lanre Guerra MD  Authorizing Provider: Lanre Guerra MD        Preanesthetic Checklist  Completed: patient identified, pre-op evaluation, timeout performed, IV checked, risks and benefits discussed, monitors and equipment checked, anesthesia consent given, hand hygiene performed and patient being monitored  Preparation  Patient position: sitting  Prep: ChloraPrep  Patient monitoring: ECG, Pulse Ox and Blood Pressure  Epidural  Skin Anesthetic: lidocaine 1%  Skin Wheal: 3 mL  Administration type: continuous  Approach: midline  Interspace: L4-5    Injection technique: BEL saline  Needle and Epidural Catheter  Needle type: Tuohy   Needle gauge: 17  Needle length: 3.5 inches  Needle insertion depth: 6.5 cm  Catheter type: springwound and multi-orifice  Catheter size: 19 G  Catheter at skin depth: 12.5 cm  Test dose: 3 mL (3 min after test dose I gave 2 additional mLs because she was really hurting) of lidocaine 1.5% with Epi 1-to-200,000  Additional Documentation: no paresthesia on injection, no significant pain on injection, negative aspiration for heme and CSF, no significant complaints from patient and no signs/symptoms of IV or SA injection  Needle localization: anatomical landmarks  Medications:  Volume per aspiration: 3 mL  Time between aspirations: 3 minutes  Assessment  Ease of block: easy  Patient's tolerance of the procedure: comfortable throughout block and no complaintsNo inadvertent dural puncture with Tuohy.  Dural puncture not performed with spinal needle

## 2020-11-10 NOTE — PLAN OF CARE
Problem: Labor Pain (Labor)  Goal: Acceptable Pain Control  Description: Labor epidural per .juan. procedure well.positioned left lat.Reports pain relief  Outcome: Ongoing, Progressing

## 2020-11-10 NOTE — PLAN OF CARE
Patient alert and oriented with even & unlabored respirations. Pain managed PRN pain medicine. Patient has voided once since being transferred to the mother/baby unit. POC discussed with the patient, and the patient verbalized understanding.   Mother baby guide and mommy meal sheet given and discussed with the patient and her significant other.

## 2020-11-10 NOTE — OP NOTE
11/10/2020      Pre-op:  Term pregnancy    Post-op:  Term pregnancy    Procedure:      Indications: 25 y.o.  with IUP at 39w3d admitted in labor.  Patient progressed to C/+2.    Findings:  male infant, vertex presentation, APGARS 9 at 1 minute, 9 at 5 minutes, weight is pending.  There was a complex presentation involving the posterior (left arm) causing a shoulder dystocia.  Fetus rotated counterclockwise and shoulder became anterior shoulder and delivered without problem.  No traction on fetal vertex.     Procedure:  Patient was placed in dorsal lithotomy position.  Patient was prepped and draped in a sterile fashion.  Patient began to push.  The vertex crowned and delivered.  The mouth and nose were suctioned with the bulb.  The remaining infant delivered without difficulty. The cord was cut and clamped.  The infant was vigorous upon delivery.  The infant was handed to the nurse.  Cord blood specimen was obtained.  The placenta delivered spontaneously and intact.  The patient tolerated the procedure well.  Patient will be monitored in recovery.    Laceration :  No  Episiotomy:  No  Forceps: No  Vacuum: No    EBL: 300 cc    Specimen:  Placenta sent No    Complications:  None      Fritz Stewart MD

## 2020-11-10 NOTE — ANESTHESIA PREPROCEDURE EVALUATION
11/10/2020  Zaheer Serna is a 25 y.o., female.    Anesthesia Evaluation     I have reviewed the Nursing Notes.       Review of Systems  Anesthesia Hx:  No problems with previous Anesthesia   Social:  Non-Smoker    Cardiovascular:  Cardiovascular Normal Exercise tolerance: good     Pulmonary:   Denies Pneumonia Denies COPD. Asthma  Denies Shortness of breath.  Denies Recent URI.  Denies Sleep Apnea.    Renal/:  Renal/ Normal     Hepatic/GI:   No Bowel Prep. Denies PUD. Denies Liver Disease. Denies Hepatitis.    Neurological:  Neurology Normal    Endocrine:  Endocrine Normal        Physical Exam  General:  Obesity    Airway/Jaw/Neck:  AIRWAY FINDINGS: Normal      Chest/Lungs:  Chest/Lungs Clear    Heart/Vascular:  Heart Findings: Normal       Mental Status:  Mental Status Findings: Normal        Anesthesia Plan  Type of Anesthesia, risks & benefits discussed:  Anesthesia Type:  epidural  Patient's Preference:   Intra-op Monitoring Plan: standard ASA monitors  Intra-op Monitoring Plan Comments:   Post Op Pain Control Plan:   Post Op Pain Control Plan Comments:   Induction:   IV  Beta Blocker:  Patient is not currently on a Beta-Blocker (No further documentation required).       Informed Consent: Patient understands risks and agrees with Anesthesia plan.  Questions answered. Anesthesia consent signed with patient.  ASA Score: 2     Day of Surgery Review of History & Physical:  There are no significant changes.  H&P update referred to the provider.         Ready For Surgery From Anesthesia Perspective.

## 2020-11-11 PROBLEM — Z34.90 ENCOUNTER FOR ELECTIVE INDUCTION OF LABOR: Status: ACTIVE | Noted: 2020-11-11

## 2020-11-11 PROBLEM — O23.40 UTI IN PREGNANCY, ANTEPARTUM: Status: ACTIVE | Noted: 2020-06-09

## 2020-11-11 PROBLEM — Z34.90 PREGNANCY: Status: RESOLVED | Noted: 2020-11-10 | Resolved: 2020-11-11

## 2020-11-11 PROBLEM — R10.9 ABDOMINAL PAIN: Status: RESOLVED | Noted: 2020-10-31 | Resolved: 2020-11-11

## 2020-11-11 PROBLEM — B95.1 GROUP BETA STREP POSITIVE: Status: ACTIVE | Noted: 2020-10-22

## 2020-11-11 LAB
ABO + RH BLD: NORMAL
BASOPHILS # BLD AUTO: 0.05 K/UL (ref 0–0.2)
BASOPHILS NFR BLD: 0.6 % (ref 0–1.9)
BLD GP AB SCN CELLS X3 SERPL QL: NORMAL
DIFFERENTIAL METHOD: ABNORMAL
EOSINOPHIL # BLD AUTO: 0.2 K/UL (ref 0–0.5)
EOSINOPHIL NFR BLD: 2 % (ref 0–8)
ERYTHROCYTE [DISTWIDTH] IN BLOOD BY AUTOMATED COUNT: 13.2 % (ref 11.5–14.5)
FETAL CELL SCN BLD QL ROSETTE: NORMAL
HCT VFR BLD AUTO: 31.5 % (ref 37–48.5)
HGB BLD-MCNC: 10.3 G/DL (ref 12–16)
IMM GRANULOCYTES # BLD AUTO: 0.04 K/UL (ref 0–0.04)
IMM GRANULOCYTES NFR BLD AUTO: 0.4 % (ref 0–0.5)
INJECT RH IG VOL PATIENT: NORMAL ML
LYMPHOCYTES # BLD AUTO: 2.5 K/UL (ref 1–4.8)
LYMPHOCYTES NFR BLD: 27.3 % (ref 18–48)
MCH RBC QN AUTO: 29.7 PG (ref 27–31)
MCHC RBC AUTO-ENTMCNC: 32.7 G/DL (ref 32–36)
MCV RBC AUTO: 91 FL (ref 82–98)
MONOCYTES # BLD AUTO: 0.7 K/UL (ref 0.3–1)
MONOCYTES NFR BLD: 7.4 % (ref 4–15)
NEUTROPHILS # BLD AUTO: 5.7 K/UL (ref 1.8–7.7)
NEUTROPHILS NFR BLD: 62.3 % (ref 38–73)
NRBC BLD-RTO: 0 /100 WBC
PLATELET # BLD AUTO: 180 K/UL (ref 150–350)
PMV BLD AUTO: 11.4 FL (ref 9.2–12.9)
RBC # BLD AUTO: 3.47 M/UL (ref 4–5.4)
WBC # BLD AUTO: 9.08 K/UL (ref 3.9–12.7)

## 2020-11-11 PROCEDURE — 63600519 RHOGAM PHARM REV CODE 636 ALT 250 W HCPCS: Performed by: OBSTETRICS & GYNECOLOGY

## 2020-11-11 PROCEDURE — 36415 COLL VENOUS BLD VENIPUNCTURE: CPT

## 2020-11-11 PROCEDURE — 85461 HEMOGLOBIN FETAL: CPT

## 2020-11-11 PROCEDURE — 72200004 HC VAGINAL DELIVERY LEVEL I

## 2020-11-11 PROCEDURE — 85025 COMPLETE CBC W/AUTO DIFF WBC: CPT

## 2020-11-11 PROCEDURE — 72100002 HC LABOR CARE, 1ST 8 HOURS

## 2020-11-11 PROCEDURE — 86850 RBC ANTIBODY SCREEN: CPT

## 2020-11-11 PROCEDURE — 11000001 HC ACUTE MED/SURG PRIVATE ROOM

## 2020-11-11 PROCEDURE — 25000003 PHARM REV CODE 250: Performed by: OBSTETRICS & GYNECOLOGY

## 2020-11-11 RX ORDER — IBUPROFEN 600 MG/1
600 TABLET ORAL EVERY 6 HOURS PRN
Qty: 30 TABLET | Refills: 0 | Status: SHIPPED | OUTPATIENT
Start: 2020-11-11 | End: 2022-12-16

## 2020-11-11 RX ORDER — IBUPROFEN 600 MG/1
600 TABLET ORAL EVERY 6 HOURS PRN
Qty: 30 TABLET | Refills: 0 | Status: SHIPPED | OUTPATIENT
Start: 2020-11-11 | End: 2020-11-14 | Stop reason: SDUPTHER

## 2020-11-11 RX ORDER — OXYCODONE AND ACETAMINOPHEN 5; 325 MG/1; MG/1
1 TABLET ORAL EVERY 4 HOURS PRN
Qty: 5 TABLET | Refills: 0 | Status: SHIPPED | OUTPATIENT
Start: 2020-11-11 | End: 2022-12-16

## 2020-11-11 RX ADMIN — OXYCODONE HYDROCHLORIDE AND ACETAMINOPHEN 1 TABLET: 5; 325 TABLET ORAL at 11:11

## 2020-11-11 RX ADMIN — HUMAN RHO(D) IMMUNE GLOBULIN 300 MCG: 300 INJECTION, SOLUTION INTRAMUSCULAR at 06:11

## 2020-11-11 RX ADMIN — OXYCODONE HYDROCHLORIDE AND ACETAMINOPHEN 1 TABLET: 5; 325 TABLET ORAL at 06:11

## 2020-11-11 RX ADMIN — OXYCODONE HYDROCHLORIDE AND ACETAMINOPHEN 1 TABLET: 5; 325 TABLET ORAL at 04:11

## 2020-11-11 RX ADMIN — PRENATAL VIT W/ FE FUMARATE-FA TAB 27-0.8 MG 1 TABLET: 27-0.8 TAB at 01:11

## 2020-11-11 RX ADMIN — IBUPROFEN 600 MG: 600 TABLET, FILM COATED ORAL at 06:11

## 2020-11-11 RX ADMIN — OXYCODONE HYDROCHLORIDE AND ACETAMINOPHEN 1 TABLET: 10; 325 TABLET ORAL at 09:11

## 2020-11-11 NOTE — PLAN OF CARE
Pt. Pain managed with scheduled and prn medications. Tolerating diet, no c/o n/v.  Ambulating and voiding w/o difficulty. I&o today. Cbc this morning. POC reviewed with pt. All questions answered. Bonding well with infant. Vss.

## 2020-11-11 NOTE — DISCHARGE SUMMARY
Discharge Summary - Obstetrics    Diagnosis:   Patient Active Problem List    Diagnosis Date Noted    Encounter for elective induction of labor 11/11/2020    Group beta Strep positive 10/22/2020    UTI in pregnancy, antepartum 06/09/2020    Rh negative status during pregnancy 02/08/2016       Patient was admitted, underwent a normal spontaneous vaginal delivery without complication.  Please see H&P and operative report for full details.  Postpartum course was uneventful. On PPD1 she had met all discharge criteria.  If baby is ready for discharge to home she desired discharge.patient stayed overnight because baby was not able to go home.     Vitals:  Temp: 97 °F (36.1 °C) (11/11/20 1127)  Pulse: 72 (11/11/20 1127)  Resp: 20 (11/11/20 1127)  BP: (!) 140/91 (11/11/20 1127)  SpO2: 98 % (11/11/20 1127)  Temp:  [96.6 °F (35.9 °C)-97.3 °F (36.3 °C)]   Pulse:  [60-74]   Resp:  [18-20]   BP: (133-142)/(82-93)   SpO2:  [95 %-100 %]   General: no acute distress, alert and oriented to person, place and time  Breasts: nontender, nonengorged  Abdomen: non-distended, non-tender to palpation, uterus firm and below the umbilicus  Pelvic: deferred, reported minimal uterine bleeding  Extremities: calves non-tender to palpation, no calf edema, erythema or palpable cords    Recent Labs   Lab 11/11/20  0558   WBC 9.08   HGB 10.3*   HCT 31.5*          Disposition: Home    Condition: Stable    Diet: Regular    Medications:  Current Discharge Medication List      START taking these medications    Details   !! ibuprofen (ADVIL,MOTRIN) 600 MG tablet Take 1 tablet (600 mg total) by mouth every 6 (six) hours as needed for Pain.  Qty: 30 tablet, Refills: 0      oxyCODONE-acetaminophen (PERCOCET) 5-325 mg per tablet Take 1 tablet by mouth every 4 (four) hours as needed.  Qty: 5 tablet, Refills: 0       !! - Potential duplicate medications found. Please discuss with provider.      CONTINUE these medications which have CHANGED    Details    !! ibuprofen (ADVIL,MOTRIN) 600 MG tablet Take 1 tablet (600 mg total) by mouth every 6 (six) hours as needed for Pain.  Qty: 30 tablet, Refills: 0       !! - Potential duplicate medications found. Please discuss with provider.      STOP taking these medications       mupirocin (BACTROBAN) 2 % ointment Comments:   Reason for Stopping:               Activity: Pelvic rest    Follow-up: 6 weeks    Precautions: Patient counseled to return or call for temperature greater than 100.4, copious foul- smelling vaginal discharge, profuse vaginal bleeding, extreme pain, nausea or vomiting, or any concerns.    Discharge Procedure Orders   Diet Adult Regular     Lifting restrictions

## 2020-11-12 VITALS
TEMPERATURE: 98 F | WEIGHT: 170 LBS | DIASTOLIC BLOOD PRESSURE: 91 MMHG | HEIGHT: 64 IN | SYSTOLIC BLOOD PRESSURE: 146 MMHG | RESPIRATION RATE: 18 BRPM | BODY MASS INDEX: 29.02 KG/M2 | HEART RATE: 60 BPM | OXYGEN SATURATION: 98 %

## 2020-11-12 PROCEDURE — 25000003 PHARM REV CODE 250: Performed by: OBSTETRICS & GYNECOLOGY

## 2020-11-12 RX ADMIN — OXYCODONE HYDROCHLORIDE AND ACETAMINOPHEN 1 TABLET: 10; 325 TABLET ORAL at 04:11

## 2020-11-12 RX ADMIN — OXYCODONE HYDROCHLORIDE AND ACETAMINOPHEN 1 TABLET: 5; 325 TABLET ORAL at 12:11

## 2020-11-12 RX ADMIN — PRENATAL VIT W/ FE FUMARATE-FA TAB 27-0.8 MG 1 TABLET: 27-0.8 TAB at 11:11

## 2020-11-12 RX ADMIN — OXYCODONE HYDROCHLORIDE AND ACETAMINOPHEN 1 TABLET: 5; 325 TABLET ORAL at 08:11

## 2020-11-12 RX ADMIN — IBUPROFEN 600 MG: 600 TABLET, FILM COATED ORAL at 11:11

## 2020-11-12 NOTE — PLAN OF CARE
Problem: Adult Inpatient Plan of Care  Goal: Plan of Care Review  Outcome: Ongoing, Progressing  Goal: Patient-Specific Goal (Individualization)  Outcome: Ongoing, Progressing  Goal: Absence of Hospital-Acquired Illness or Injury  Outcome: Ongoing, Progressing  Goal: Optimal Comfort and Wellbeing  Outcome: Ongoing, Progressing  Goal: Readiness for Transition of Care  Outcome: Ongoing, Progressing  Goal: Rounds/Family Conference  Outcome: Ongoing, Progressing     Problem:  Fall Injury Risk  Goal: Absence of Fall, Infant Drop and Related Injury  Outcome: Ongoing, Progressing     Problem: Infection  Goal: Infection Symptom Resolution  Outcome: Ongoing, Progressing     Problem: Adjustment to Role Transition (Postpartum Vaginal Delivery)  Goal: Successful Maternal Role Transition  Outcome: Ongoing, Progressing     Problem: Bleeding (Postpartum Vaginal Delivery)  Goal: Hemostasis  Outcome: Ongoing, Progressing     Problem: Infection (Postpartum Vaginal Delivery)  Goal: Absence of Infection Signs/Symptoms  Outcome: Ongoing, Progressing     Problem: Pain (Postpartum Vaginal Delivery)  Goal: Acceptable Pain Control  Outcome: Ongoing, Progressing     Problem: Urinary Retention (Postpartum Vaginal Delivery)  Goal: Effective Urinary Elimination  Outcome: Ongoing, Progressing

## 2020-11-12 NOTE — PLAN OF CARE
Patient progressing well, verbalizes readiness for discharge. IV removed per RN on previous shift. Pain well controlled with PO medication PRN. Has passed gas. BP slightly elevated MD aware. Bonding well with infant. Bottle feeding q 3-4 hrs. Discharge instructions reviewed with patient, verbalizes understanding.

## 2020-11-12 NOTE — DISCHARGE INSTRUCTIONS
After a Vaginal Birth    General Discharge Instructions    · May follow a regular diet, unless otherwise discussed with physician.    · Take showers, not baths unless otherwise discussed with physician.    · Activity as tolerated.    · No lifting or heavy exercise for 6 weeks, no driving for 2 weeks, no sexual intercourse, douching or tampons for 6 weeks    · May return to work/school as discussed with physician  · Take medications as directed    · Discuss birth control with physician    · Breast care support bra worn at all times    · Lactation consultant referral number ( [phone] or [phone])    Call Your Healthcare Provider Right Away If You Have:  · A temperature of 100.4°F or higher.  · If your blood pressure is over 155/105.  · You have difficulty catching your breath or trouble breathing.  · Heavy vaginal bleeding, clots, or vaginal discharge with foul odor. (heavier than menses)  · Persistent nausea or vomiting.  · You gain more than 3 pounds in 3 days.  · Severe headaches not relieved by Tylenol (acetaminophen) or Motrin (ibuprofen)  · Blurry or double vision, see spots or flashing lights.  · Dizziness or fainting.  · New onset swelling or worsening of existing swelling.  · Burning or pain when you urinate.  · No bowel movement for 5 days.  · Redness, warmth, swelling, or pain in the lower leg.  · Redness, discharge, or pain worse than you had in the hospital.  · Burning, pain, red streaks, or lumpy areas in your breasts.  · Cracks, blisters, or blood on your nipples.  · Feelings of extreme sadness or anxiety, or a feeling that you dont want to be with your baby.  · If you have any new or unusual symptoms or have questions or concerns    Some of these symptoms can occur up to 4 to 6 weeks after delivery. This can be a sign of pre-eclampsia, which is a serious disease that can cause stroke, seizures, organ damage, or death. Do not wait to call your doctor or seek medical attention.            If  You Had Stitches  You may have received stitches in the skin near your vagina. The stitches might have closed an episiotomy (an incision that enlarges the opening of the vagina). Or you may have needed stitches to repair torn skin. Either way, your stitches should dissolve within weeks. Until then, you can help reduce discomfort, aid healing, and reduce your risk of infection by keeping the stitches clean. These tips can help:    · Gently wipe from front to back after you urinate or have a bowel movement.  · After wiping, spray warm water on the area. Or you can have a sitz bath. This means sitting in a tub with a few inches of water in it. Then pat the area dry or use a hairdryer on a cool setting.  · You can take a shower instead of a bath.  · Change sanitary pads at least every 2-4 hours.  · Place cold or heat packs on the area as directed by your doctors or nurses. Keep a thin towel between the pack and your skin.  · Sit on firm seats so the stitches pull less.     Follow-Up  Schedule a  follow-up exam with your healthcare provider for about 6 weeks after delivery. During this exam, your uterus and vaginal area will be checked. Contact your healthcare provider if you think you or your baby are having any problems.

## 2020-11-14 ENCOUNTER — HOSPITAL ENCOUNTER (EMERGENCY)
Facility: HOSPITAL | Age: 25
Discharge: HOME | End: 2020-11-14
Attending: EMERGENCY MEDICINE
Payer: MEDICAID

## 2020-11-14 VITALS
SYSTOLIC BLOOD PRESSURE: 144 MMHG | TEMPERATURE: 98 F | DIASTOLIC BLOOD PRESSURE: 83 MMHG | OXYGEN SATURATION: 98 % | HEART RATE: 90 BPM | RESPIRATION RATE: 16 BRPM

## 2020-11-14 DIAGNOSIS — S62.001A CLOSED NONDISPLACED FRACTURE OF SCAPHOID OF RIGHT WRIST, UNSPECIFIED PORTION OF SCAPHOID, INITIAL ENCOUNTER: Primary | ICD-10-CM
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
## 2020-11-14 LAB
BUN SERPL-MCNC: 4 MG/DL (ref 6–30)
CHLORIDE SERPL-SCNC: 106 MMOL/L (ref 95–110)
CREAT SERPL-MCNC: 0.7 MG/DL (ref 0.5–1.4)
ETHANOL SERPL-MCNC: 23 MG/DL
GLUCOSE SERPL-MCNC: 104 MG/DL (ref 70–110)
HCT VFR BLD CALC: 34 %PCV (ref 36–54)
POC IONIZED CALCIUM: 1.15 MMOL/L (ref 1.06–1.42)
POC TCO2 (MEASURED): 21 MMOL/L (ref 23–29)
POTASSIUM BLD-SCNC: 3.3 MMOL/L (ref 3.5–5.1)
SAMPLE: ABNORMAL
SODIUM BLD-SCNC: 138 MMOL/L (ref 136–145)

## 2020-11-14 PROCEDURE — 99284 PR EMERGENCY DEPT VISIT,LEVEL IV: ICD-10-PCS | Mod: 25,,, | Performed by: PHYSICIAN ASSISTANT

## 2020-11-14 PROCEDURE — 25000003 PHARM REV CODE 250: Performed by: PHYSICIAN ASSISTANT

## 2020-11-14 PROCEDURE — 80047 BASIC METABLC PNL IONIZED CA: CPT

## 2020-11-14 PROCEDURE — 25500020 PHARM REV CODE 255: Performed by: EMERGENCY MEDICINE

## 2020-11-14 PROCEDURE — 80320 DRUG SCREEN QUANTALCOHOLS: CPT

## 2020-11-14 PROCEDURE — 99285 EMERGENCY DEPT VISIT HI MDM: CPT | Mod: 25

## 2020-11-14 PROCEDURE — 25622 PR CLOSED RX NAVICULAR FX: ICD-10-PCS | Mod: RT,,, | Performed by: PHYSICIAN ASSISTANT

## 2020-11-14 PROCEDURE — 63600175 PHARM REV CODE 636 W HCPCS: Performed by: PHYSICIAN ASSISTANT

## 2020-11-14 PROCEDURE — 99284 EMERGENCY DEPT VISIT MOD MDM: CPT | Mod: 25,,, | Performed by: PHYSICIAN ASSISTANT

## 2020-11-14 PROCEDURE — 29125 APPL SHORT ARM SPLINT STATIC: CPT | Mod: RT

## 2020-11-14 PROCEDURE — 25622 CLTX CARPL SCPHD FX W/O MNPJ: CPT | Mod: RT,,, | Performed by: PHYSICIAN ASSISTANT

## 2020-11-14 PROCEDURE — 96374 THER/PROPH/DIAG INJ IV PUSH: CPT | Mod: 59

## 2020-11-14 RX ORDER — ACETAMINOPHEN 500 MG
1000 TABLET ORAL
Status: COMPLETED | OUTPATIENT
Start: 2020-11-14 | End: 2020-11-14

## 2020-11-14 RX ORDER — DIPHENHYDRAMINE HYDROCHLORIDE 50 MG/ML
50 INJECTION INTRAMUSCULAR; INTRAVENOUS
Status: COMPLETED | OUTPATIENT
Start: 2020-11-14 | End: 2020-11-14

## 2020-11-14 RX ADMIN — ACETAMINOPHEN 1000 MG: 500 TABLET ORAL at 03:11

## 2020-11-14 RX ADMIN — IOHEXOL 75 ML: 350 INJECTION, SOLUTION INTRAVENOUS at 05:11

## 2020-11-14 RX ADMIN — DIPHENHYDRAMINE HYDROCHLORIDE 50 MG: 50 INJECTION, SOLUTION INTRAMUSCULAR; INTRAVENOUS at 03:11
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
## 2020-11-14 NOTE — ED NOTES
LOC: The patient is awake, alert and aware of environment with an appropriate affect, the patient is oriented x 4 and speaking appropriately.  APPEARANCE: Patient resting comfortably and in no acute distress, patient is clean and well groomed, patient's clothing is properly fastened.  SKIN: The skin is warm and dry, color consistent with ethnicity, patient has normal skin turgor and moist mucus membranes, skin intact, no breakdown or bruising noted. Denies diaphoresis   MUSCULOSKELETAL: Reports face pain, lower back pain, generalized body pain, Patient moving all extremities well, no obvious swelling nor deformities noted.   RESPIRATORY: Airway is open and patent, respirations are spontaneous, patient has a normal effort and rate, no accessory muscle use noted. Lung sounds clear throughout all fields. Denies productive cough  CARDIAC: Patient has a normal rate, no periphreal edema noted, capillary refill < 3 seconds. Denies chest pain  ABDOMEN: Reports left abdominal pain.  Abd soft and non tender to palpation, no distention noted. Bowel sounds present in all quads. Denies n/v, diarrhea/constipation, hematuria or dysuria   NEUROLOGIC: PERRL, 2mm bilaterally, eyes open spontaneously, behavior appropriate to situation, follows commands, facial expression symmetrical, bilateral hand grasp equal and even, purposeful motor response noted, normal sensation in all extremities when touched with a finger.  : Reports vaginal pain

## 2020-11-16 NOTE — ANESTHESIA POSTPROCEDURE EVALUATION
Anesthesia Post Evaluation    Patient: Zaheer Serna    Procedure(s) Performed: * No procedures listed *    Final Anesthesia Type: epidural    Patient location during evaluation: labor & delivery  Patient participation: Yes- Able to Participate  Level of consciousness: awake and alert  Post-procedure vital signs: reviewed and stable  Pain management: adequate  Airway patency: patent    PONV status at discharge: No PONV  Anesthetic complications: no      Cardiovascular status: blood pressure returned to baseline and hemodynamically stable  Respiratory status: unassisted and spontaneous ventilation  Hydration status: euvolemic  Follow-up not needed.          Vitals Value Taken Time   /83 11/14/20 0602   Temp 36.7 °C (98 °F) 11/14/20 0240   Pulse 90 11/14/20 0501   Resp 16 11/14/20 0240   SpO2 98 % 11/14/20 0501         No case tracking events are documented in the log.      Pain/Flora Score: No data recorded

## 2020-11-18 ENCOUNTER — HOSPITAL ENCOUNTER (EMERGENCY)
Facility: HOSPITAL | Age: 25
Discharge: HOME | End: 2020-11-18
Attending: EMERGENCY MEDICINE
Payer: MEDICAID

## 2020-11-18 VITALS
HEART RATE: 72 BPM | SYSTOLIC BLOOD PRESSURE: 143 MMHG | TEMPERATURE: 99 F | RESPIRATION RATE: 19 BRPM | DIASTOLIC BLOOD PRESSURE: 84 MMHG | OXYGEN SATURATION: 98 %

## 2020-11-18 DIAGNOSIS — R42 LIGHTHEADEDNESS: ICD-10-CM
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
## 2020-11-18 DIAGNOSIS — R07.9 CHEST PAIN: ICD-10-CM

## 2020-11-18 DIAGNOSIS — M79.603 ARM PAIN: Primary | ICD-10-CM

## 2020-11-18 LAB
ALBUMIN SERPL BCP-MCNC: 2.7 G/DL (ref 3.5–5.2)
ALP SERPL-CCNC: 99 U/L (ref 55–135)
ALT SERPL W/O P-5'-P-CCNC: 27 U/L (ref 10–44)
ANION GAP SERPL CALC-SCNC: 10 MMOL/L (ref 8–16)
AST SERPL-CCNC: 39 U/L (ref 10–40)
BASOPHILS # BLD AUTO: 0.02 K/UL (ref 0–0.2)
BASOPHILS NFR BLD: 0.2 % (ref 0–1.9)
BILIRUB SERPL-MCNC: 0.8 MG/DL (ref 0.1–1)
BUN SERPL-MCNC: 15 MG/DL (ref 6–20)
CALCIUM SERPL-MCNC: 8.2 MG/DL (ref 8.7–10.5)
CHLORIDE SERPL-SCNC: 104 MMOL/L (ref 95–110)
CO2 SERPL-SCNC: 21 MMOL/L (ref 23–29)
CREAT SERPL-MCNC: 0.8 MG/DL (ref 0.5–1.4)
CTP QC/QA: YES
DIFFERENTIAL METHOD: ABNORMAL
EOSINOPHIL # BLD AUTO: 0 K/UL (ref 0–0.5)
EOSINOPHIL NFR BLD: 0.2 % (ref 0–8)
ERYTHROCYTE [DISTWIDTH] IN BLOOD BY AUTOMATED COUNT: 13.3 % (ref 11.5–14.5)
EST. GFR  (AFRICAN AMERICAN): >60 ML/MIN/1.73 M^2
EST. GFR  (NON AFRICAN AMERICAN): >60 ML/MIN/1.73 M^2
GLUCOSE SERPL-MCNC: 145 MG/DL (ref 70–110)
HCT VFR BLD AUTO: 33.9 % (ref 37–48.5)
HGB BLD-MCNC: 11 G/DL (ref 12–16)
IMM GRANULOCYTES # BLD AUTO: 0.06 K/UL (ref 0–0.04)
IMM GRANULOCYTES NFR BLD AUTO: 0.6 % (ref 0–0.5)
LYMPHOCYTES # BLD AUTO: 1.4 K/UL (ref 1–4.8)
LYMPHOCYTES NFR BLD: 14.3 % (ref 18–48)
MCH RBC QN AUTO: 29.6 PG (ref 27–31)
MCHC RBC AUTO-ENTMCNC: 32.4 G/DL (ref 32–36)
MCV RBC AUTO: 91 FL (ref 82–98)
MONOCYTES # BLD AUTO: 0.7 K/UL (ref 0.3–1)
MONOCYTES NFR BLD: 7.6 % (ref 4–15)
NEUTROPHILS # BLD AUTO: 7.3 K/UL (ref 1.8–7.7)
NEUTROPHILS NFR BLD: 77.1 % (ref 38–73)
NRBC BLD-RTO: 0 /100 WBC
PLATELET # BLD AUTO: 243 K/UL (ref 150–350)
PMV BLD AUTO: 10.7 FL (ref 9.2–12.9)
POTASSIUM SERPL-SCNC: 3.3 MMOL/L (ref 3.5–5.1)
PROT SERPL-MCNC: 7.2 G/DL (ref 6–8.4)
RBC # BLD AUTO: 3.71 M/UL (ref 4–5.4)
SARS-COV-2 RDRP RESP QL NAA+PROBE: NEGATIVE
SODIUM SERPL-SCNC: 135 MMOL/L (ref 136–145)
WBC # BLD AUTO: 9.47 K/UL (ref 3.9–12.7)

## 2020-11-18 PROCEDURE — 80053 COMPREHEN METABOLIC PANEL: CPT

## 2020-11-18 PROCEDURE — 99285 EMERGENCY DEPT VISIT HI MDM: CPT | Mod: 25

## 2020-11-18 PROCEDURE — 93010 EKG 12-LEAD: ICD-10-PCS | Mod: ,,, | Performed by: INTERNAL MEDICINE

## 2020-11-18 PROCEDURE — 25000003 PHARM REV CODE 250: Performed by: PHYSICIAN ASSISTANT

## 2020-11-18 PROCEDURE — 85025 COMPLETE CBC W/AUTO DIFF WBC: CPT

## 2020-11-18 PROCEDURE — 93010 ELECTROCARDIOGRAM REPORT: CPT | Mod: ,,, | Performed by: INTERNAL MEDICINE

## 2020-11-18 PROCEDURE — 99284 EMERGENCY DEPT VISIT MOD MDM: CPT | Mod: ,,, | Performed by: PHYSICIAN ASSISTANT

## 2020-11-18 PROCEDURE — 99284 PR EMERGENCY DEPT VISIT,LEVEL IV: ICD-10-PCS | Mod: ,,, | Performed by: PHYSICIAN ASSISTANT

## 2020-11-18 PROCEDURE — 93005 ELECTROCARDIOGRAM TRACING: CPT

## 2020-11-18 PROCEDURE — 96374 THER/PROPH/DIAG INJ IV PUSH: CPT

## 2020-11-18 PROCEDURE — U0002 COVID-19 LAB TEST NON-CDC: HCPCS | Performed by: PHYSICIAN ASSISTANT

## 2020-11-18 PROCEDURE — 63600175 PHARM REV CODE 636 W HCPCS: Performed by: PHYSICIAN ASSISTANT

## 2020-11-18 RX ORDER — METHOCARBAMOL 500 MG/1
1000 TABLET, FILM COATED ORAL 3 TIMES DAILY PRN
Qty: 20 TABLET | Refills: 0 | Status: SHIPPED | OUTPATIENT
Start: 2020-11-18 | End: 2020-11-18 | Stop reason: SDUPTHER

## 2020-11-18 RX ORDER — NAPROXEN 500 MG/1
500 TABLET ORAL 2 TIMES DAILY WITH MEALS
Qty: 30 TABLET | Refills: 0 | Status: SHIPPED | OUTPATIENT
Start: 2020-11-18 | End: 2020-11-18 | Stop reason: SDUPTHER

## 2020-11-18 RX ORDER — LIDOCAINE 50 MG/G
1 PATCH TOPICAL
Status: DISCONTINUED | OUTPATIENT
Start: 2020-11-18 | End: 2020-11-18 | Stop reason: HOSPADM

## 2020-11-18 RX ORDER — KETOROLAC TROMETHAMINE 30 MG/ML
10 INJECTION, SOLUTION INTRAMUSCULAR; INTRAVENOUS
Status: COMPLETED | OUTPATIENT
Start: 2020-11-18 | End: 2020-11-18

## 2020-11-18 RX ORDER — ACETAMINOPHEN 500 MG
1000 TABLET ORAL
Status: COMPLETED | OUTPATIENT
Start: 2020-11-18 | End: 2020-11-18

## 2020-11-18 RX ORDER — METHOCARBAMOL 500 MG/1
1000 TABLET, FILM COATED ORAL
Status: COMPLETED | OUTPATIENT
Start: 2020-11-18 | End: 2020-11-18

## 2020-11-18 RX ORDER — METHOCARBAMOL 500 MG/1
1000 TABLET, FILM COATED ORAL 3 TIMES DAILY PRN
Qty: 20 TABLET | Refills: 0 | Status: SHIPPED | OUTPATIENT
Start: 2020-11-18 | End: 2020-11-23

## 2020-11-18 RX ORDER — NAPROXEN 500 MG/1
500 TABLET ORAL 2 TIMES DAILY WITH MEALS
Qty: 30 TABLET | Refills: 0 | Status: SHIPPED | OUTPATIENT
Start: 2020-11-18 | End: 2022-12-16

## 2020-11-18 RX ADMIN — LIDOCAINE 1 PATCH: 50 PATCH TOPICAL at 01:11

## 2020-11-18 RX ADMIN — KETOROLAC TROMETHAMINE 10 MG: 30 INJECTION, SOLUTION INTRAMUSCULAR at 12:11

## 2020-11-18 RX ADMIN — ACETAMINOPHEN 1000 MG: 500 TABLET ORAL at 12:11

## 2020-11-18 RX ADMIN — METHOCARBAMOL 1000 MG: 500 TABLET ORAL at 03:11

## 2020-11-18 NOTE — ED NOTES
Patient given clean underwear and a paper scrub top.  Requesting a muscle relaxer before going home.
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
## 2021-04-26 ENCOUNTER — PATIENT MESSAGE (OUTPATIENT)
Dept: RESEARCH | Facility: HOSPITAL | Age: 26
End: 2021-04-26

## 2022-12-16 ENCOUNTER — OFFICE VISIT (OUTPATIENT)
Dept: OBSTETRICS AND GYNECOLOGY | Facility: CLINIC | Age: 27
End: 2022-12-16
Payer: MEDICAID

## 2022-12-16 VITALS
HEIGHT: 64 IN | BODY MASS INDEX: 28.27 KG/M2 | WEIGHT: 165.56 LBS | DIASTOLIC BLOOD PRESSURE: 80 MMHG | SYSTOLIC BLOOD PRESSURE: 120 MMHG

## 2022-12-16 DIAGNOSIS — O09.32 LATE PRENATAL CARE AFFECTING PREGNANCY IN SECOND TRIMESTER: Primary | ICD-10-CM

## 2022-12-16 DIAGNOSIS — Z12.4 ENCOUNTER FOR SCREENING FOR MALIGNANT NEOPLASM OF CERVIX: ICD-10-CM

## 2022-12-16 DIAGNOSIS — Z11.59 ENCOUNTER FOR HEPATITIS C SCREENING TEST FOR LOW RISK PATIENT: ICD-10-CM

## 2022-12-16 PROBLEM — Z34.90 ENCOUNTER FOR ELECTIVE INDUCTION OF LABOR: Status: RESOLVED | Noted: 2020-11-11 | Resolved: 2022-12-16

## 2022-12-16 PROCEDURE — 3074F PR MOST RECENT SYSTOLIC BLOOD PRESSURE < 130 MM HG: ICD-10-PCS | Mod: CPTII,,, | Performed by: OBSTETRICS & GYNECOLOGY

## 2022-12-16 PROCEDURE — 3079F PR MOST RECENT DIASTOLIC BLOOD PRESSURE 80-89 MM HG: ICD-10-PCS | Mod: CPTII,,, | Performed by: OBSTETRICS & GYNECOLOGY

## 2022-12-16 PROCEDURE — 99203 OFFICE O/P NEW LOW 30 MIN: CPT | Mod: TH,S$PBB,, | Performed by: OBSTETRICS & GYNECOLOGY

## 2022-12-16 PROCEDURE — 1159F PR MEDICATION LIST DOCUMENTED IN MEDICAL RECORD: ICD-10-PCS | Mod: CPTII,,, | Performed by: OBSTETRICS & GYNECOLOGY

## 2022-12-16 PROCEDURE — 3008F PR BODY MASS INDEX (BMI) DOCUMENTED: ICD-10-PCS | Mod: CPTII,,, | Performed by: OBSTETRICS & GYNECOLOGY

## 2022-12-16 PROCEDURE — 87086 URINE CULTURE/COLONY COUNT: CPT | Performed by: OBSTETRICS & GYNECOLOGY

## 2022-12-16 PROCEDURE — 1160F PR REVIEW ALL MEDS BY PRESCRIBER/CLIN PHARMACIST DOCUMENTED: ICD-10-PCS | Mod: CPTII,,, | Performed by: OBSTETRICS & GYNECOLOGY

## 2022-12-16 PROCEDURE — 99999 PR PBB SHADOW E&M-EST. PATIENT-LVL II: ICD-10-PCS | Mod: PBBFAC,,, | Performed by: OBSTETRICS & GYNECOLOGY

## 2022-12-16 PROCEDURE — 99999 PR PBB SHADOW E&M-EST. PATIENT-LVL II: CPT | Mod: PBBFAC,,, | Performed by: OBSTETRICS & GYNECOLOGY

## 2022-12-16 PROCEDURE — 3074F SYST BP LT 130 MM HG: CPT | Mod: CPTII,,, | Performed by: OBSTETRICS & GYNECOLOGY

## 2022-12-16 PROCEDURE — 3008F BODY MASS INDEX DOCD: CPT | Mod: CPTII,,, | Performed by: OBSTETRICS & GYNECOLOGY

## 2022-12-16 PROCEDURE — 1159F MED LIST DOCD IN RCRD: CPT | Mod: CPTII,,, | Performed by: OBSTETRICS & GYNECOLOGY

## 2022-12-16 PROCEDURE — 1160F RVW MEDS BY RX/DR IN RCRD: CPT | Mod: CPTII,,, | Performed by: OBSTETRICS & GYNECOLOGY

## 2022-12-16 PROCEDURE — 87591 N.GONORRHOEAE DNA AMP PROB: CPT | Performed by: OBSTETRICS & GYNECOLOGY

## 2022-12-16 PROCEDURE — 99212 OFFICE O/P EST SF 10 MIN: CPT | Mod: PBBFAC,TH,PN | Performed by: OBSTETRICS & GYNECOLOGY

## 2022-12-16 PROCEDURE — 3079F DIAST BP 80-89 MM HG: CPT | Mod: CPTII,,, | Performed by: OBSTETRICS & GYNECOLOGY

## 2022-12-16 PROCEDURE — 88175 CYTOPATH C/V AUTO FLUID REDO: CPT | Performed by: OBSTETRICS & GYNECOLOGY

## 2022-12-16 PROCEDURE — 99203 PR OFFICE/OUTPT VISIT, NEW, LEVL III, 30-44 MIN: ICD-10-PCS | Mod: TH,S$PBB,, | Performed by: OBSTETRICS & GYNECOLOGY

## 2022-12-16 PROCEDURE — 87491 CHLMYD TRACH DNA AMP PROBE: CPT | Performed by: OBSTETRICS & GYNECOLOGY

## 2022-12-17 LAB
BACTERIA UR CULT: NORMAL
C TRACH DNA SPEC QL NAA+PROBE: NOT DETECTED
N GONORRHOEA DNA SPEC QL NAA+PROBE: NOT DETECTED

## 2022-12-20 ENCOUNTER — PATIENT MESSAGE (OUTPATIENT)
Dept: MATERNAL FETAL MEDICINE | Facility: CLINIC | Age: 27
End: 2022-12-20
Payer: MEDICAID

## 2022-12-21 ENCOUNTER — PATIENT MESSAGE (OUTPATIENT)
Dept: MATERNAL FETAL MEDICINE | Facility: CLINIC | Age: 27
End: 2022-12-21

## 2022-12-21 ENCOUNTER — PROCEDURE VISIT (OUTPATIENT)
Dept: MATERNAL FETAL MEDICINE | Facility: CLINIC | Age: 27
End: 2022-12-21
Payer: MEDICAID

## 2022-12-21 DIAGNOSIS — Z32.01 POSITIVE PREGNANCY TEST: ICD-10-CM

## 2022-12-21 DIAGNOSIS — Z36.89 ENCOUNTER FOR ULTRASOUND TO CHECK FETAL GROWTH: Primary | ICD-10-CM

## 2022-12-21 PROBLEM — O09.32 LATE PRENATAL CARE AFFECTING PREGNANCY IN SECOND TRIMESTER: Status: ACTIVE | Noted: 2022-12-21

## 2022-12-21 PROCEDURE — 76805 US MFM PROCEDURE (VIEWPOINT): ICD-10-PCS | Mod: 26,S$PBB,, | Performed by: OBSTETRICS & GYNECOLOGY

## 2022-12-21 PROCEDURE — 76805 OB US >/= 14 WKS SNGL FETUS: CPT | Mod: PBBFAC | Performed by: OBSTETRICS & GYNECOLOGY

## 2022-12-22 LAB
FINAL PATHOLOGIC DIAGNOSIS: NORMAL
Lab: NORMAL

## 2022-12-22 NOTE — PROGRESS NOTES
"Pregnancy dating, labs, ultrasound reports, prenatal testing, and problem list; prior records and results; and available outside records were reviewed and updated in EMR.  Pertinent findings were noted below.    Reason for Visit: Initial Prenatal Visit    18w1d by Estimated Date of Delivery: 5/18/23    Blood pressure 120/80, height 5' 4" (1.626 m), weight 75.1 kg (165 lb 9.1 oz), last menstrual period 08/11/2022.    Late prenatal care affecting pregnancy in second trimester  -     Liquid-Based Pap Smear, Screening  -     C. trachomatis/N. gonorrhoeae by AMP DNA  -     Urine culture  -     Type & Screen; Future; Expected date: 12/16/2022  -     CBC Auto Differential; Future; Expected date: 12/16/2022  -     Hepatitis B Surface Antigen; Future; Expected date: 12/16/2022  -     RPR; Future; Expected date: 12/16/2022  -     HIV 1/2 Ag/Ab (4th Gen); Future; Expected date: 12/16/2022  -     Hepatitis C Antibody; Future; Expected date: 12/16/2022  -     Varicella Zoster Antibody, IgG; Future; Expected date: 12/16/2022  -     US MFM Procedure (Viewpoint); Future    Encounter for screening for malignant neoplasm of cervix  -     Liquid-Based Pap Smear, Screening    Encounter for hepatitis C screening test for low risk patient  -     Hepatitis C Antibody; Future; Expected date: 12/16/2022      Late PNC  No cramping or bleeding.  Not yet feeling fetal movement.  No significant PMH or PSH.  Denies prior OB complications.  Denies FH of bleeding disorders or birth defects.  Likely >18 weeks based on LMP and exam.  Dating/anatomy u/s ordered.  Initial labs as above  Will re-address aneuploidy screening after dating is confirmed if pt is still in EGA window.     OB Prenatal Exam: Last filed by GORDON Ash MD on 12/21/2022 10:19 PM       General Physical Exam       HEENT: normal  Skin: normal  Extremities: normal     Neurological: normal  Abdomen: normal                   Pelvic Exam       Vulva: normal  Vagina: normal     " Cervix: normal  Uterus: 18 weeks     Adnexa: normal  Rectum: deferred     Spines: average  Sacrum: concave     Subpubic Arch: normal  Diagonal Conjugate: not reached     Pelvic Type: gynecoid                                 Pain and bleeding precautions given  Follow-up: 4 weeks    NEW PREGNANCY COUNSELING  Patient was counseled today on:  - Routine prenatal blood tests including HIV and anticipated course of prenatal care  - Prenatal vitamins and folic acid  - Weight gain, nutrition, and exercise  - Seafood and mercury  - Properly heating deli and prepared meats and avoiding unrefrigerated deli  meats, cheeses, and milk products,   - Avoiding cat litter and raw meats due to risk of Toxoplasmosis precautions   - Accuracy of the LMP-based MAGALIS and the value of an early TV-u/s  - OTC medication in the first trimester  - Harmful effects of smoking, etOH, and recreational drugs  - MFM u/s  at 18-20 weeks.  - Common complaints of pregnancy  - Seat belt use  - Childbirth classes and hospital facilities  - All questions were answered    Total time of 30 minutes, including face-to-face time and non-face-to-face time preparing to see the patient (eg, review of tests), obtaining and/or reviewing separately obtained history, documenting clinical information in the electronic or other health record, independently interpreting results, communicating results to the patient/family/caregiver, or care coordination.

## 2023-01-13 ENCOUNTER — PATIENT MESSAGE (OUTPATIENT)
Dept: OBSTETRICS AND GYNECOLOGY | Facility: CLINIC | Age: 28
End: 2023-01-13
Payer: MEDICAID

## 2023-01-25 ENCOUNTER — PATIENT MESSAGE (OUTPATIENT)
Dept: MATERNAL FETAL MEDICINE | Facility: CLINIC | Age: 28
End: 2023-01-25
Payer: MEDICAID

## 2023-01-26 ENCOUNTER — PROCEDURE VISIT (OUTPATIENT)
Dept: MATERNAL FETAL MEDICINE | Facility: CLINIC | Age: 28
End: 2023-01-26
Payer: MEDICAID

## 2023-01-26 DIAGNOSIS — Z36.89 ENCOUNTER FOR ULTRASOUND TO CHECK FETAL GROWTH: ICD-10-CM

## 2023-01-26 PROCEDURE — 76816 OB US FOLLOW-UP PER FETUS: CPT | Mod: PBBFAC | Performed by: OBSTETRICS & GYNECOLOGY

## 2023-01-26 PROCEDURE — 76816 US MFM PROCEDURE (VIEWPOINT): ICD-10-PCS | Mod: 26,S$PBB,, | Performed by: OBSTETRICS & GYNECOLOGY

## 2023-01-27 ENCOUNTER — PATIENT MESSAGE (OUTPATIENT)
Dept: OBSTETRICS AND GYNECOLOGY | Facility: CLINIC | Age: 28
End: 2023-01-27
Payer: MEDICAID

## 2023-01-27 ENCOUNTER — NURSE TRIAGE (OUTPATIENT)
Dept: ADMINISTRATIVE | Facility: CLINIC | Age: 28
End: 2023-01-27
Payer: MEDICAID

## 2023-01-27 NOTE — TELEPHONE ENCOUNTER
OOC RN  Patient calling Dr. Ash  OB,   Pregnant 24 weeks,  Patient has a cold, and congestion,   would like to know what she can take for her cold. stuffy nose. Please advise pregnant pat.   Care advise is home care.l  for any new or worsening symptoms to call back OOC RN.   Patient Vu.    Reason for Disposition   Colds with no complications    Additional Information   Negative: SEVERE difficulty breathing (e.g., struggling for each breath, speaks in single words)   Negative: Very weak (can't stand)   Negative: Sounds like a life-threatening emergency to the triager   Negative: Patient sounds very sick or weak to the triager   Negative: Fever > 100.0 F (37.8 C) and bedridden (e.g., nursing home patient, stroke, chronic illness, recovering from surgery)   Negative: Fever > 101 F (38.3 C) and over 60 years of age   Negative: Fever > 100.0 F (37.8 C) and has diabetes mellitus or a weak immune system (e.g., HIV positive, cancer chemotherapy, organ transplant, splenectomy, chronic steroids)   Negative: Fever > 103 F (39.4 C)   Negative: Fever present > 3 days (72 hours)   Negative: Fever returns after gone for over 24 hours and symptoms worse or not improved   Negative: Sinus pain (not just congestion) and fever   Negative: Earache   Negative: Sinus congestion (pressure, fullness) present > 10 days   Negative: Nasal discharge present > 10 days   Negative: Using nasal washes and pain medicine > 24 hours and sinus pain (lower forehead, cheekbone, or eye) persists   Negative: Patient wants to be seen   Negative: Sore throat present > 5 days    Protocols used: Common Cold-A-[place]  [place]

## 2023-02-07 DIAGNOSIS — O09.32 LATE PRENATAL CARE AFFECTING PREGNANCY IN SECOND TRIMESTER: Primary | ICD-10-CM

## 2023-02-22 ENCOUNTER — HOSPITAL ENCOUNTER (EMERGENCY)
Facility: OTHER | Age: 28
Discharge: HOME | End: 2023-02-22
Attending: OBSTETRICS & GYNECOLOGY
Payer: MEDICAID

## 2023-02-22 VITALS
DIASTOLIC BLOOD PRESSURE: 56 MMHG | SYSTOLIC BLOOD PRESSURE: 106 MMHG | HEART RATE: 80 BPM | TEMPERATURE: 98 F | OXYGEN SATURATION: 100 %

## 2023-02-22 DIAGNOSIS — Z3A.27 27 WEEKS GESTATION OF PREGNANCY: ICD-10-CM

## 2023-02-22 DIAGNOSIS — R55 SYNCOPE: ICD-10-CM

## 2023-02-22 DIAGNOSIS — R42 DIZZINESS: Primary | ICD-10-CM

## 2023-02-22 LAB
ABO + RH BLD: NORMAL
ALBUMIN SERPL BCP-MCNC: 2.8 G/DL (ref 3.5–5.2)
ALP SERPL-CCNC: 61 U/L (ref 55–135)
ALT SERPL W/O P-5'-P-CCNC: 11 U/L (ref 10–44)
ANION GAP SERPL CALC-SCNC: 7 MMOL/L (ref 8–16)
AST SERPL-CCNC: 14 U/L (ref 10–40)
BACTERIA #/AREA URNS HPF: ABNORMAL /HPF
BASOPHILS # BLD AUTO: 0.03 K/UL (ref 0–0.2)
BASOPHILS NFR BLD: 0.4 % (ref 0–1.9)
BILIRUB SERPL-MCNC: 0.4 MG/DL (ref 0.1–1)
BILIRUB SERPL-MCNC: NORMAL MG/DL
BILIRUB UR QL STRIP: NEGATIVE
BLD GP AB SCN CELLS X3 SERPL QL: NORMAL
BLOOD URINE, POC: NORMAL
BUN SERPL-MCNC: 5 MG/DL (ref 6–20)
CALCIUM SERPL-MCNC: 7.9 MG/DL (ref 8.7–10.5)
CHLORIDE SERPL-SCNC: 109 MMOL/L (ref 95–110)
CLARITY UR: ABNORMAL
CO2 SERPL-SCNC: 20 MMOL/L (ref 23–29)
COLOR UR: YELLOW
COLOR, POC UA: NORMAL
CREAT SERPL-MCNC: 0.6 MG/DL (ref 0.5–1.4)
DIFFERENTIAL METHOD: ABNORMAL
EOSINOPHIL # BLD AUTO: 0.2 K/UL (ref 0–0.5)
EOSINOPHIL NFR BLD: 1.8 % (ref 0–8)
ERYTHROCYTE [DISTWIDTH] IN BLOOD BY AUTOMATED COUNT: 12.1 % (ref 11.5–14.5)
EST. GFR  (NO RACE VARIABLE): >60 ML/MIN/1.73 M^2
GLUCOSE SERPL-MCNC: 72 MG/DL (ref 70–110)
GLUCOSE SERPL-MCNC: 78 MG/DL (ref 70–110)
GLUCOSE UR QL STRIP: 100
GLUCOSE UR QL STRIP: ABNORMAL
HCT VFR BLD AUTO: 31.1 % (ref 37–48.5)
HGB BLD-MCNC: 10.8 G/DL (ref 12–16)
HGB UR QL STRIP: ABNORMAL
HYALINE CASTS #/AREA URNS LPF: 2 /LPF
IMM GRANULOCYTES # BLD AUTO: 0.04 K/UL (ref 0–0.04)
IMM GRANULOCYTES NFR BLD AUTO: 0.5 % (ref 0–0.5)
KETONES UR QL STRIP: NEGATIVE
KETONES UR QL STRIP: NORMAL
LEUKOCYTE ESTERASE UR QL STRIP: ABNORMAL
LEUKOCYTE ESTERASE URINE, POC: NORMAL
LYMPHOCYTES # BLD AUTO: 1.6 K/UL (ref 1–4.8)
LYMPHOCYTES NFR BLD: 19.3 % (ref 18–48)
MCH RBC QN AUTO: 31.9 PG (ref 27–31)
MCHC RBC AUTO-ENTMCNC: 34.7 G/DL (ref 32–36)
MCV RBC AUTO: 92 FL (ref 82–98)
MICROSCOPIC COMMENT: ABNORMAL
MONOCYTES # BLD AUTO: 0.6 K/UL (ref 0.3–1)
MONOCYTES NFR BLD: 7.4 % (ref 4–15)
NEUTROPHILS # BLD AUTO: 5.7 K/UL (ref 1.8–7.7)
NEUTROPHILS NFR BLD: 70.6 % (ref 38–73)
NITRITE UR QL STRIP: NEGATIVE
NITRITE, POC UA: NORMAL
NRBC BLD-RTO: 0 /100 WBC
PH UR STRIP: 7 [PH] (ref 5–8)
PH, POC UA: 5
PLATELET # BLD AUTO: 204 K/UL (ref 150–450)
PMV BLD AUTO: 10.3 FL (ref 9.2–12.9)
POCT GLUCOSE: 72 MG/DL (ref 70–110)
POTASSIUM SERPL-SCNC: 3.4 MMOL/L (ref 3.5–5.1)
PROT SERPL-MCNC: 6.9 G/DL (ref 6–8.4)
PROT UR QL STRIP: ABNORMAL
PROTEIN, POC: NORMAL
RBC # BLD AUTO: 3.39 M/UL (ref 4–5.4)
RBC #/AREA URNS HPF: 3 /HPF (ref 0–4)
SODIUM SERPL-SCNC: 136 MMOL/L (ref 136–145)
SP GR UR STRIP: 1.02 (ref 1–1.03)
SPECIFIC GRAVITY, POC UA: 1.02
SQUAMOUS #/AREA URNS HPF: 3 /HPF
URN SPEC COLLECT METH UR: ABNORMAL
UROBILINOGEN UR STRIP-ACNC: ABNORMAL EU/DL
UROBILINOGEN, POC UA: 1
WBC # BLD AUTO: 8.12 K/UL (ref 3.9–12.7)
WBC #/AREA URNS HPF: 21 /HPF (ref 0–5)
YEAST URNS QL MICRO: ABNORMAL

## 2023-02-22 PROCEDURE — 59025 FETAL NON-STRESS TEST: CPT

## 2023-02-22 PROCEDURE — 96361 HYDRATE IV INFUSION ADD-ON: CPT | Mod: 59

## 2023-02-22 PROCEDURE — 99285 EMERGENCY DEPT VISIT HI MDM: CPT | Mod: 25

## 2023-02-22 PROCEDURE — 59025 FETAL NON-STRESS TEST: CPT | Mod: 26,,, | Performed by: OBSTETRICS & GYNECOLOGY

## 2023-02-22 PROCEDURE — 96372 THER/PROPH/DIAG INJ SC/IM: CPT

## 2023-02-22 PROCEDURE — 96360 HYDRATION IV INFUSION INIT: CPT

## 2023-02-22 PROCEDURE — 93010 EKG 12-LEAD: ICD-10-PCS | Mod: ,,, | Performed by: INTERNAL MEDICINE

## 2023-02-22 PROCEDURE — 87086 URINE CULTURE/COLONY COUNT: CPT

## 2023-02-22 PROCEDURE — 86900 BLOOD TYPING SEROLOGIC ABO: CPT

## 2023-02-22 PROCEDURE — 25000003 PHARM REV CODE 250

## 2023-02-22 PROCEDURE — 93005 ELECTROCARDIOGRAM TRACING: CPT | Mod: 59

## 2023-02-22 PROCEDURE — 63600175 PHARM REV CODE 636 W HCPCS

## 2023-02-22 PROCEDURE — 80053 COMPREHEN METABOLIC PANEL: CPT

## 2023-02-22 PROCEDURE — 93010 ELECTROCARDIOGRAM REPORT: CPT | Mod: ,,, | Performed by: INTERNAL MEDICINE

## 2023-02-22 PROCEDURE — 85025 COMPLETE CBC W/AUTO DIFF WBC: CPT

## 2023-02-22 PROCEDURE — 81000 URINALYSIS NONAUTO W/SCOPE: CPT

## 2023-02-22 PROCEDURE — 59025 PR FETAL 2N-STRESS TEST: ICD-10-PCS | Mod: 26,,, | Performed by: OBSTETRICS & GYNECOLOGY

## 2023-02-22 PROCEDURE — 99284 PR EMERGENCY DEPT VISIT,LEVEL IV: ICD-10-PCS | Mod: 25,,, | Performed by: OBSTETRICS & GYNECOLOGY

## 2023-02-22 PROCEDURE — 99284 EMERGENCY DEPT VISIT MOD MDM: CPT | Mod: 25,,, | Performed by: OBSTETRICS & GYNECOLOGY

## 2023-02-22 RX ORDER — NITROFURANTOIN 25; 75 MG/1; MG/1
100 CAPSULE ORAL ONCE
Qty: 30 CAPSULE | Refills: 1 | Status: SHIPPED | OUTPATIENT
Start: 2023-02-22 | End: 2023-02-22

## 2023-02-22 RX ADMIN — SODIUM CHLORIDE, SODIUM LACTATE, POTASSIUM CHLORIDE, CALCIUM CHLORIDE AND DEXTROSE MONOHYDRATE 500 ML: 5; 600; 310; 30; 20 INJECTION, SOLUTION INTRAVENOUS at 12:02

## 2023-02-22 RX ADMIN — CEFTRIAXONE 1 G: 1 INJECTION, POWDER, FOR SOLUTION INTRAMUSCULAR; INTRAVENOUS at 01:02

## 2023-02-22 NOTE — ED PROVIDER NOTES
Encounter Date: 2023       History     Chief Complaint   Patient presents with    Dizziness     HPI  Zaheer Serna is a 27 y.o. [id number] at 27w6d presents complaining of dizziness and light-headedness starting this morning. Patient reports not eating anything for breakfast  or drinking any water. Denies overt syncope. Denies any abdominal trauma. Denies loss of consciousness.   This IUP is complicated by minimal PNC and recent UTI and anxiety.  Patient denies contractions, denies vaginal bleeding, denies LOF.   Fetal Movement: normal.     Review of patient's allergies indicates:   Allergen Reactions    Iodinated contrast media Hives     Past Medical History:   Diagnosis Date    Asthma     Heart murmur of       Past Surgical History:   Procedure Laterality Date    TONSILLECTOMY      partial tionsillectomy     Family History   Problem Relation Age of Onset    Breast cancer Neg Hx     Colon cancer Neg Hx     Ovarian cancer Neg Hx      Social History     Tobacco Use    Smoking status: Former     Types: Cigarettes    Smokeless tobacco: Never   Substance Use Topics    Alcohol use: Yes     Comment: patient drinks every day sara and daqauri    Drug use: Yes     Types: Marijuana     Review of Systems   Constitutional:  Negative for fever.   HENT:  Negative for sore throat.    Eyes:  Positive for visual disturbance.   Respiratory:  Negative for shortness of breath.    Cardiovascular:  Negative for chest pain.   Gastrointestinal:  Negative for diarrhea.   Genitourinary:  Negative for difficulty urinating, pelvic pain and vaginal bleeding.   Skin:  Negative for color change.   Neurological:  Positive for dizziness and light-headedness. Negative for headaches.   Psychiatric/Behavioral:  Negative for confusion.    All other systems reviewed and are negative.    Physical Exam     Initial Vitals [23 1040]   BP Pulse Resp Temp SpO2   (!) 106/56 82 -- 98.2 °F (36.8 °C) (!) 92 %      MAP       --          Physical Exam    Vitals reviewed.  Constitutional: She appears well-developed and well-nourished. She is not diaphoretic. No distress.   HENT:   Head: Normocephalic and atraumatic.   Eyes: EOM are normal.   Cardiovascular:  Normal rate.           Pulmonary/Chest: She has no wheezes.   Abdominal: Abdomen is soft. There is no abdominal tenderness.   Genitourinary:    Genitourinary Comments: Pelvic exam deferred     Musculoskeletal:         General: Normal range of motion.     Neurological: She is alert and oriented to person, place, and time.   Skin: Skin is warm, dry and intact. No rash noted.   Psychiatric: She has a normal mood and affect. Her speech is normal and behavior is normal.       ED Course   Fetal non-stress test    Date/Time: 2/22/2023 5:59 PM  Performed by: Di Mata MD  Authorized by: Areli Ibarra MD     Nonstress Test:     Variability:  6-25 BPM    Decelerations:  None    Accelerations:  10 bpm    Acoustic Stimulator: No      Baseline:  135    Uterine Irritability: No      Contractions:  Not present  Biophysical Profile:     Nonstress Test Interpretation: reactive      Overall Impression:  Reassuring  Post-procedure:     Patient tolerance:  Patient tolerated the procedure well with no immediate complications  Labs Reviewed   CBC W/ AUTO DIFFERENTIAL - Abnormal; Notable for the following components:       Result Value    RBC 3.39 (*)     Hemoglobin 10.8 (*)     Hematocrit 31.1 (*)     MCH 31.9 (*)     All other components within normal limits   COMPREHENSIVE METABOLIC PANEL - Abnormal; Notable for the following components:    Potassium 3.4 (*)     CO2 20 (*)     BUN 5 (*)     Calcium 7.9 (*)     Albumin 2.8 (*)     Anion Gap 7 (*)     All other components within normal limits   URINALYSIS - Abnormal; Notable for the following components:    Appearance, UA Hazy (*)     Protein, UA 2+ (*)     Glucose, UA Trace (*)     Occult Blood UA Trace (*)     Urobilinogen, UA 2.0-3.0 (*)      Leukocytes, UA 3+ (*)     All other components within normal limits   URINALYSIS MICROSCOPIC - Abnormal; Notable for the following components:    WBC, UA 21 (*)     Bacteria Moderate (*)     Yeast, UA Occasional (*)     Hyaline Casts, UA 2 (*)     All other components within normal limits   CULTURE, URINE    Narrative:     Indicated criteria for high risk culture:->Pregnant   POCT URINALYSIS, DIPSTICK OR TABLET REAGENT, AUTOMATED, WITH MICROSCOP   TYPE & SCREEN   POCT GLUCOSE          Imaging Results    None          Medications   dextrose 5% lactated ringers bolus 500 mL (0 mLs Intravenous Stopped 23 1409)   cefTRIAXone (ROCEPHIN) 1 g in LIDOcaine HCL 10 mg/ml (1%) 4 mL IM only syringe (1 g Intramuscular Given 23 1315)     Medical Decision Making:   Initial Assessment:   27 y.o.  at 27w6d presenting for dizziness  ED Management:  - VSS, afebrile   - POCT glucose appropriate   - EKG sinus rhythm   - Dizziness improved with D5 bolus and sandwich  - Patient with recurrent UTI. UA  with 3+ leuks. 1 g rocephin given   - Patient with h/o of anxiety. F/u message sent            Attending Attestation:   Physician Attestation Statement for Resident:  As the supervising MD   Physician Attestation Statement: I have personally seen and examined this patient.   I agree with the above history.  -:   As the supervising MD I agree with the above PE.     As the supervising MD I agree with the above treatment, course, plan, and disposition.   -: NST reactive and reassuring, toco with no contractions.  POC glucose, EKG normal.  Dizziness improved with IVF.  EKG with a h/o recurrent UTI and 3+ LE on UA.  1GM rocephin given and urine sent for culture.  Agree with discharge to home.  Will f/u with OB in 1 week.  I was personally present during the critical portions of the procedure(s) performed by the resident and was immediately available in the ED to provide services and assistance as needed during the entire  procedure.  I have reviewed and agree with the residents interpretation of the following: EKG and lab data.  I have reviewed the following: old records at this facility.                           Clinical Impression:   Final diagnoses:  [R55] Syncope  [Z3A.27] 27 weeks gestation of pregnancy  [R42] Dizziness (Primary)        ED Disposition Condition    Discharge Stable          ED Prescriptions       Medication Sig Dispense Start Date End Date Auth. Provider    nitrofurantoin, macrocrystal-monohydrate, (MACROBID) 100 MG capsule () Take 1 capsule (100 mg total) by mouth once. for 1 dose 30 capsule 2023 Di Mata MD          Follow-up Information    None          Di Mata MD  Resident  23 [id number]       Areli Ibarra MD  238

## 2023-02-23 LAB — BACTERIA UR CULT: NORMAL

## 2023-03-03 ENCOUNTER — PATIENT MESSAGE (OUTPATIENT)
Dept: OBSTETRICS AND GYNECOLOGY | Facility: CLINIC | Age: 28
End: 2023-03-03
Payer: MEDICAID

## 2023-03-07 ENCOUNTER — HOSPITAL ENCOUNTER (EMERGENCY)
Facility: OTHER | Age: 28
Discharge: HOME | End: 2023-03-07
Attending: OBSTETRICS & GYNECOLOGY
Payer: MEDICAID

## 2023-03-07 ENCOUNTER — NURSE TRIAGE (OUTPATIENT)
Dept: ADMINISTRATIVE | Facility: CLINIC | Age: 28
End: 2023-03-07
Payer: MEDICAID

## 2023-03-07 ENCOUNTER — TELEPHONE (OUTPATIENT)
Dept: OBSTETRICS AND GYNECOLOGY | Facility: CLINIC | Age: 28
End: 2023-03-07
Payer: MEDICAID

## 2023-03-07 VITALS
RESPIRATION RATE: 16 BRPM | TEMPERATURE: 98 F | OXYGEN SATURATION: 99 % | HEART RATE: 97 BPM | SYSTOLIC BLOOD PRESSURE: 112 MMHG | DIASTOLIC BLOOD PRESSURE: 74 MMHG

## 2023-03-07 DIAGNOSIS — R53.1 WEAKNESS: ICD-10-CM

## 2023-03-07 DIAGNOSIS — R55 NEAR SYNCOPE: Primary | ICD-10-CM

## 2023-03-07 DIAGNOSIS — Z3A.29 29 WEEKS GESTATION OF PREGNANCY: ICD-10-CM

## 2023-03-07 LAB
BILIRUB SERPL-MCNC: NORMAL MG/DL
BLOOD URINE, POC: NORMAL
COLOR, POC UA: NORMAL
GLUCOSE UR QL STRIP: NORMAL
KETONES UR QL STRIP: NORMAL
LEUKOCYTE ESTERASE URINE, POC: POSITIVE
NITRITE, POC UA: NORMAL
PH, POC UA: 7
PROTEIN, POC: NORMAL
SPECIFIC GRAVITY, POC UA: 1.01
UROBILINOGEN, POC UA: 4

## 2023-03-07 PROCEDURE — 99284 EMERGENCY DEPT VISIT MOD MDM: CPT | Mod: 25

## 2023-03-07 PROCEDURE — 25000003 PHARM REV CODE 250

## 2023-03-07 PROCEDURE — 87481 CANDIDA DNA AMP PROBE: CPT | Mod: 59

## 2023-03-07 PROCEDURE — 59025 FETAL NON-STRESS TEST: CPT | Mod: 26,,, | Performed by: OBSTETRICS & GYNECOLOGY

## 2023-03-07 PROCEDURE — 87591 N.GONORRHOEAE DNA AMP PROB: CPT

## 2023-03-07 PROCEDURE — 99284 PR EMERGENCY DEPT VISIT,LEVEL IV: ICD-10-PCS | Mod: 25,,, | Performed by: OBSTETRICS & GYNECOLOGY

## 2023-03-07 PROCEDURE — 59025 FETAL NON-STRESS TEST: CPT

## 2023-03-07 PROCEDURE — 59025 PR FETAL 2N-STRESS TEST: ICD-10-PCS | Mod: 26,,, | Performed by: OBSTETRICS & GYNECOLOGY

## 2023-03-07 PROCEDURE — 99284 EMERGENCY DEPT VISIT MOD MDM: CPT | Mod: 25,,, | Performed by: OBSTETRICS & GYNECOLOGY

## 2023-03-07 RX ORDER — TERCONAZOLE 8 MG/G
1 CREAM VAGINAL NIGHTLY
Qty: 20 G | Refills: 0 | Status: SHIPPED | OUTPATIENT
Start: 2023-03-07 | End: 2023-03-24 | Stop reason: ALTCHOICE

## 2023-03-07 RX ORDER — ACETAMINOPHEN 325 MG/1
650 TABLET ORAL EVERY 6 HOURS PRN
Status: DISCONTINUED | OUTPATIENT
Start: 2023-03-07 | End: 2023-03-07 | Stop reason: HOSPADM

## 2023-03-07 RX ORDER — NYSTATIN AND TRIAMCINOLONE ACETONIDE 100000; 1 [USP'U]/G; MG/G
CREAM TOPICAL 2 TIMES DAILY
Qty: 30 G | Refills: 0 | Status: SHIPPED | OUTPATIENT
Start: 2023-03-07 | End: 2023-03-24 | Stop reason: ALTCHOICE

## 2023-03-07 RX ADMIN — ACETAMINOPHEN 650 MG: 325 TABLET, FILM COATED ORAL at 01:03

## 2023-03-07 NOTE — ED PROVIDER NOTES
Encounter Date: 3/7/2023       History     Chief Complaint   Patient presents with    Dizziness    Vomiting    Weakness     HPI  Zaheer Serna is a 27 y.o. [id number] at 29w5d presents complaining of dizziness, headache, weakness and a near syncopal episode. 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 She was then transported to EMS to the [facility] ([place]). She endorses currently feeling weak.The patient reports an episode like this happening in a previous pregnancy.     The patient reports not having had breakfast. She states that during her pregnancies she does not have appetites and with this pregnancy being no different. The patient commonly eats one meal a  day but primarily snacks. She denies regular nausea and vomiting in this pregnancy.     Additionally, the patient endorses increased vaginal discharge. Endorses pruritis, irritations, odorless. Reports history of recurrent yeast infections.    This IUP is complicated by late PNC, Rh negative, asthma, loss of appetite.  Patient denies contractions, denies vaginal bleeding, denies LOF.   Fetal Movement: normal.     Review of patient's allergies indicates:   Allergen Reactions    Iodinated contrast media Hives     Past Medical History:   Diagnosis Date    Asthma     Heart murmur of       Past Surgical History:   Procedure Laterality Date    TONSILLECTOMY      partial tionsillectomy     Family History   Problem Relation Age of Onset    Breast cancer Neg Hx     Colon cancer Neg Hx     Ovarian cancer Neg Hx      Social History     Tobacco Use    Smoking status: Former     Types: Cigarettes    Smokeless tobacco: Never   Substance Use Topics    Alcohol use: Yes     Comment: patient drinks every day sara and daqauri    Drug use: Yes     Types: Marijuana     Review of Systems   Constitutional:  Positive for fatigue. Negative for chills and  fever.   HENT:  Negative for congestion.    Eyes:  Negative for photophobia and visual disturbance.   Respiratory:  Negative for chest tightness and shortness of breath.    Cardiovascular:  Negative for chest pain.   Gastrointestinal:  Negative for abdominal pain, constipation, diarrhea, nausea and vomiting.   Neurological:  Positive for dizziness, weakness and light-headedness. Negative for headaches.   Psychiatric/Behavioral:  Negative for behavioral problems, confusion, decreased concentration, dysphoric mood and hallucinations.      Physical Exam     Initial Vitals   BP Pulse Resp Temp SpO2   03/07/23 1248 03/07/23 1234 03/07/23 1234 03/07/23 1234 03/07/23 1234   109/61 81 16 98.1 °F (36.7 °C) 100 %      MAP       --                Physical Exam    Vitals reviewed.  Constitutional: She appears well-developed and well-nourished. She is not diaphoretic. No distress.   HENT:   Head: Normocephalic and atraumatic.   Eyes: EOM are normal.   Neck:   Normal range of motion.  Cardiovascular:  Normal rate.           Pulmonary/Chest: No respiratory distress. She exhibits no tenderness.   Abdominal: Abdomen is soft. There is no abdominal tenderness. There is no rebound and no guarding.   Genitourinary:    Vaginal discharge (minimal thin white, odorless) present.     Musculoskeletal:         General: Normal range of motion.      Cervical back: Normal range of motion.     Neurological: She is alert and oriented to person, place, and time.   Skin: Skin is warm and dry.   Psychiatric: She has a normal mood and affect. Her behavior is normal. Judgment and thought content normal.       ED Course   Fetal non-stress test    Date/Time: 3/7/2023 2:41 PM  Performed by: Flaco Boyce MD  Authorized by: Areli Ibarra MD     Nonstress Test:     Variability:  6-25 BPM    Decelerations:  None    Accelerations:  10 bpm    Baseline:  135    Contractions:  Not present  Biophysical Profile:     Nonstress Test Interpretation:  reactive      Overall Impression:  Reassuring  Labs Reviewed   VAGINOSIS SCREEN BY DNA PROBE   C. TRACHOMATIS/N. GONORRHOEAE BY AMP DNA   POCT URINALYSIS, DIPSTICK OR TABLET REAGENT, AUTOMATED, WITH MICROSCOP          Imaging Results    None          Medications - No data to display    Medical Decision Making:   ED Management:  Near syncopal episode  - Asymptomatic  - Patient has had serial normotensive BPs in [place]  - PO hydration, able to tolerate sandwich  - EKG: Normal sinus rhythm   - Urine dipstick: negative for ketones, positive for UTI (being treated by primary OBGYN)  - Vaginosis and GCCT collected   - No contractions on toco  - RTC for prenatal appt as scheduled  - RT JOAN for ctx of increasing frequency/intensity, LOF, decreased fetal movement, vaginal bleeding  - Patient stable for discharge at this time  - Will discharge with antifungals empirically  - ED return precautions given  - She voiced understanding and is in agreement with the plan            Attending Attestation:   Physician Attestation Statement for Resident:  As the supervising MD   Physician Attestation Statement: I have personally seen and examined this patient.   I agree with the above history.  -:   As the supervising MD I agree with the above PE.     As the supervising MD I agree with the above treatment, course, plan, and disposition.   -: NST reactive and reassuring, toco with no contractions.  EKG normal, VSS.  Symptoms improved with PO hydration and tolerating po.  UA c/w UTI and under treatment.  Affirm, GC, Chlam swabs taken.  Rx sent for yeast.  Agree with discharge to home.  Will f/u with OB in 1 week.  I was personally present during the critical portions of the procedure(s) performed by the resident and was immediately available in the ED to provide services and assistance as needed during the entire procedure.  I have reviewed and agree with the residents interpretation of the following: EKG.  I have reviewed the following:  old records at this facility.                           Clinical Impression:   Final diagnoses:  [R55] Near syncope (Primary)  [R53.1] Weakness  [Z3A.29] 29 weeks gestation of pregnancy        ED Disposition Condition    Discharge Stable          ED Prescriptions       Medication Sig Dispense Start Date End Date Auth. Provider    terconazole (TERAZOL 3) 0.8 % vaginal cream Place 1 applicator vaginally every evening. for 3 days 20 g 3/7/2023 3/10/2023 Flaco Boyce MD    nystatin-triamcinolone (MYCOLOG II) cream Apply topically 2 (two) times daily. Use for 7 days 30 g 3/7/2023 -- Flaco Boyce MD          Follow-up Information    None          Flaco Boyce MD  Resident  03/07/23 [id number]       Areli Ibarra MD  03/08/23 9945

## 2023-03-07 NOTE — MEDICAL/APP STUDENT
Date of Admit: 3/7/2023    HPI:         Chief Complaint   Patient presents with   Dizziness, Vomiting, and Weakness    Zaheer Serna is a 27 y.o., at 29w5d by Estimated Date of Delivery: 23 presents complaining of syncope. She reports that when she stands, she feels light-headed, dizzy, and nauseous. Last ate breakfast consisting of peanut butter crackers. Patient reports that she is currently taking antibiotics for a UTI but was not able to report abx name. Patient reports fetal movements and denies vaginal bleeding, discharge, and loss of fluids.      This IUP is complicated by current UTI infection being treated by antibiotics and late prenatal care affecting second trimester of pregnancy.     Contractions: No   Bleeding: No   Loss of fluid: No   Fetal movement: Yes   Nausea: Yes   Vomiting: Yes   Headache: Yes     If yes to headache:  Blurry Vision: Yes   RUQ Pain: No         MEDICATIONS & ALLERGIES:     No current facility-administered medications on file prior to encounter.     No current outpatient medications on file prior to encounter.        Review of patient's allergies indicates:   Allergen Reactions    Iodinated contrast media Hives         PAST HISTORY:     Past Medical History:   Diagnosis Date    Asthma     Heart murmur of         Past Surgical History:   Procedure Laterality Date    TONSILLECTOMY      partial tionsillectomy       Family History   Problem Relation Age of Onset    Breast cancer Neg Hx     Colon cancer Neg Hx     Ovarian cancer Neg Hx        Social History     Socioeconomic History    Marital status: Single   Tobacco Use    Smoking status: Former     Types: Cigarettes    Smokeless tobacco: Never   Substance and Sexual Activity    Alcohol use: Yes     Comment: patient drinks every day sara and daqauri    Drug use: Yes     Types: Marijuana    Sexual activity: Yes       Review of Systems:  Review of Systems   Constitutional: Negative.    HENT: Negative.      Eyes:  Positive for blurred vision.   Respiratory: Negative.     Cardiovascular: Negative.    Gastrointestinal:  Positive for nausea and vomiting.   Genitourinary: Negative.    Musculoskeletal: Negative.    Skin: Negative.    Neurological:  Positive for dizziness and headaches.   Endo/Heme/Allergies: Negative.    Psychiatric/Behavioral: Negative.       Exam     Vitals:    03/07/23 1234 03/07/23 1248   BP:  109/61   Pulse: 81 82   Resp: 16    Temp: 98.1 °F (36.7 °C)    TempSrc: Oral    SpO2: 100%        There is no height or weight on file to calculate BMI.    /61   Pulse 82   Temp 98.1 °F (36.7 °C) (Oral)   Resp 16   LMP 08/11/2022 (Approximate)   SpO2 100%       Assessment and Plan   Current Problems List:  There are no hospital problems to display for this patient.    Orthostatic Hypotension  Drink fluids and increase salt intake in diet        Arabella Lozano

## 2023-03-07 NOTE — TELEPHONE ENCOUNTER
----- Message from Marie Mukherjee sent at 3/7/2023 11:12 AM CST -----  Regarding: Patient call back  Who called:FLORA GORMAN [[phone]]    What is the request in detail: Patient is requesting a call back. Pt states she feels light headed and feels like she is going to pass out. She would like to know if she should be seen today, and how often she is supposed to get a shot. She was transferred to the nurse on call   Please advise.    Can the clinic reply by [facility]? No    Best call back number:[phone]    Additional Information: N/A

## 2023-03-07 NOTE — DISCHARGE INSTRUCTIONS
Keep previously scheduled clinic appointment  Return to L&D if you experience contractions every 2-5 minutes for two consecutive hours  Vaginal Bleeding  Decreased fetal movement  Leaking of fluid  Headache, dizziness or blurred vision  Temperature 100.4 or greater  Call the clinic ([phone]) during the day time or L&D ([phone]) after hours for any questions/concerns.  Drink 8-10 glasses of water a day

## 2023-03-07 NOTE — TELEPHONE ENCOUNTER
Patient was transferred to the nurse triage line and advised to go to the ED. Patient is currently admitted.

## 2023-03-07 NOTE — TELEPHONE ENCOUNTER
Patient is 29 weeks pregnant. She c/o SOB and feeling faint. States that she is possibly having a panic attack. Advised per protocol to go to the nearest ED. Call 911 if symptoms worsen or if she has no one to drive her. Patient VU. Advised the patient to call back with any further questions or if symptoms worsen.        Reason for Disposition   Difficulty breathing and persists > 10 minutes and not relieved by reassurance provided by triager    Additional Information   Negative: SEVERE difficulty breathing (e.g., struggling for each breath, speaks in single words)   Negative: Bluish (or gray) lips or face   Negative: Difficult to awaken or acting confused (e.g., disoriented, slurred speech)   Negative: Hysterical or combative behavior   Negative: Sounds like a life-threatening emergency to the triager    Protocols used: Anxiety and Panic Attack-A-[place]

## 2023-03-08 LAB
CANDIDA RRNA VAG QL PROBE: POSITIVE
T VAGINALIS RRNA GENITAL QL PROBE: NEGATIVE

## 2023-03-09 LAB
C TRACH DNA SPEC QL NAA+PROBE: NOT DETECTED
N GONORRHOEA DNA SPEC QL NAA+PROBE: NOT DETECTED

## 2023-03-10 ENCOUNTER — ROUTINE PRENATAL (OUTPATIENT)
Dept: OBSTETRICS AND GYNECOLOGY | Facility: CLINIC | Age: 28
End: 2023-03-10
Payer: MEDICAID

## 2023-03-10 VITALS
DIASTOLIC BLOOD PRESSURE: 70 MMHG | WEIGHT: 166.88 LBS | SYSTOLIC BLOOD PRESSURE: 112 MMHG | BODY MASS INDEX: 28.65 KG/M2

## 2023-03-10 DIAGNOSIS — O09.33 LATE PRENATAL CARE AFFECTING PREGNANCY IN THIRD TRIMESTER: Primary | ICD-10-CM

## 2023-03-10 DIAGNOSIS — F41.9 ANXIETY DURING PREGNANCY IN THIRD TRIMESTER, ANTEPARTUM: ICD-10-CM

## 2023-03-10 DIAGNOSIS — O23.43 URINARY TRACT INFECTION IN MOTHER DURING THIRD TRIMESTER OF PREGNANCY: ICD-10-CM

## 2023-03-10 DIAGNOSIS — O26.893 RH NEGATIVE STATE IN ANTEPARTUM PERIOD, THIRD TRIMESTER: ICD-10-CM

## 2023-03-10 DIAGNOSIS — Z67.91 RH NEGATIVE STATE IN ANTEPARTUM PERIOD, THIRD TRIMESTER: ICD-10-CM

## 2023-03-10 DIAGNOSIS — O99.343 ANXIETY DURING PREGNANCY IN THIRD TRIMESTER, ANTEPARTUM: ICD-10-CM

## 2023-03-10 DIAGNOSIS — O26.13 LOW WEIGHT GAIN DURING PREGNANCY IN THIRD TRIMESTER: ICD-10-CM

## 2023-03-10 PROBLEM — O23.40 RECURRENT URINARY TRACT INFECTION AFFECTING PREGNANCY, ANTEPARTUM: Status: ACTIVE | Noted: 2023-03-10

## 2023-03-10 PROBLEM — O09.30 LATE PRENATAL CARE AFFECTING PREGNANCY, ANTEPARTUM: Status: ACTIVE | Noted: 2022-12-21

## 2023-03-10 PROCEDURE — 99999 PR PBB SHADOW E&M-EST. PATIENT-LVL II: ICD-10-PCS | Mod: PBBFAC,,, | Performed by: OBSTETRICS & GYNECOLOGY

## 2023-03-10 PROCEDURE — 99999 PR PBB SHADOW E&M-EST. PATIENT-LVL II: CPT | Mod: PBBFAC,,, | Performed by: OBSTETRICS & GYNECOLOGY

## 2023-03-10 PROCEDURE — 99214 PR OFFICE/OUTPT VISIT, EST, LEVL IV, 30-39 MIN: ICD-10-PCS | Mod: TH,S$PBB,, | Performed by: OBSTETRICS & GYNECOLOGY

## 2023-03-10 PROCEDURE — 87088 URINE BACTERIA CULTURE: CPT

## 2023-03-10 PROCEDURE — 96372 THER/PROPH/DIAG INJ SC/IM: CPT | Mod: PBBFAC,PN

## 2023-03-10 PROCEDURE — 87086 URINE CULTURE/COLONY COUNT: CPT

## 2023-03-10 PROCEDURE — 87106 FUNGI IDENTIFICATION YEAST: CPT

## 2023-03-10 PROCEDURE — 99212 OFFICE O/P EST SF 10 MIN: CPT | Mod: PBBFAC,25,TH,PN | Performed by: OBSTETRICS & GYNECOLOGY

## 2023-03-10 PROCEDURE — 99214 OFFICE O/P EST MOD 30 MIN: CPT | Mod: TH,S$PBB,, | Performed by: OBSTETRICS & GYNECOLOGY

## 2023-03-10 RX ORDER — NITROFURANTOIN 25; 75 MG/1; MG/1
100 CAPSULE ORAL
Status: ON HOLD | COMMUNITY
Start: 2023-02-28 | End: 2023-05-16 | Stop reason: HOSPADM

## 2023-03-10 RX ADMIN — HUMAN RHO(D) IMMUNE GLOBULIN 300 MCG: 300 INJECTION, SOLUTION INTRAMUSCULAR at 11:03

## 2023-03-10 NOTE — PROGRESS NOTES
Pregnancy dating, labs, ultrasound reports, prenatal testing, and problem list; prior records and results; and available outside records were reviewed and updated in EMR.  Pertinent findings were noted below.    Reason for Visit   Routine Prenatal Visit    HPI   27 y.o., at 30w1d by Estimated Date of Delivery: 23    Reports episodes of anxiety, near syncope for which she has presented to the OB ED previously with negative work up. Reports she had similar feelings during her first pregnancy.     Contractions: No   Bleeding: No   Loss of fluid: No   Fetal movement: Yes   Nausea: No   Vomiting: No   Headache: No     Exam   /70   Wt 75.7 kg (166 lb 14.2 oz)   LMP 2022 (Approximate)   BMI 28.65 kg/m²     TWG: -3lb  GENERAL: No acute distress  ABD: Gravid    Assessment and Plan   Late prenatal care affecting pregnancy in third trimester  -     US MFM Procedure ([facility]); Future    Rh negative state in antepartum period, third trimester  -     rho(D) immune globulin injection 300 mcg    Urinary tract infection in mother during third trimester of pregnancy  -     Urine culture    Low weight gain during pregnancy in third trimester  -     US MFM Procedure ([facility]); Future    Anxiety during pregnancy in third trimester, antepartum         Glucola on Thursday, as well as remaining 1T labs   Mental health resources provided    Compliant with UTI meds (macrobid), picking up Rx for yeast infection today; Denies symptoms (no dysuria, hematuria)    Urine culture sent    Repeat U/S ordered for poor weight gain this pregnancy    Rhogam today     labor precautions given  Follow-up: 2 weeks     Lianna Angeles MD  OBGYN PGY-2    I have reviewed the Resident's progress note.  I have personally interviewed and examined the patient at bedside and agree with the findings as documented.  All patient questions answered.  -- CHARLES Ash M.D.

## 2023-03-11 ENCOUNTER — PATIENT MESSAGE (OUTPATIENT)
Dept: OTHER | Facility: OTHER | Age: 28
End: 2023-03-11
Payer: MEDICAID

## 2023-03-12 LAB — BACTERIA UR CULT: ABNORMAL

## 2023-03-15 ENCOUNTER — PATIENT MESSAGE (OUTPATIENT)
Dept: MATERNAL FETAL MEDICINE | Facility: CLINIC | Age: 28
End: 2023-03-15
Payer: MEDICAID

## 2023-03-24 ENCOUNTER — PROCEDURE VISIT (OUTPATIENT)
Dept: MATERNAL FETAL MEDICINE | Facility: CLINIC | Age: 28
End: 2023-03-24
Payer: MEDICAID

## 2023-03-24 ENCOUNTER — ROUTINE PRENATAL (OUTPATIENT)
Dept: OBSTETRICS AND GYNECOLOGY | Facility: CLINIC | Age: 28
End: 2023-03-24
Payer: MEDICAID

## 2023-03-24 ENCOUNTER — PROCEDURE VISIT (OUTPATIENT)
Dept: OBSTETRICS AND GYNECOLOGY | Facility: CLINIC | Age: 28
End: 2023-03-24
Payer: MEDICAID

## 2023-03-24 VITALS
SYSTOLIC BLOOD PRESSURE: 110 MMHG | WEIGHT: 168.19 LBS | DIASTOLIC BLOOD PRESSURE: 70 MMHG | BODY MASS INDEX: 28.87 KG/M2

## 2023-03-24 DIAGNOSIS — O09.32 LATE PRENATAL CARE AFFECTING PREGNANCY IN SECOND TRIMESTER: ICD-10-CM

## 2023-03-24 DIAGNOSIS — B37.31 VULVOVAGINAL CANDIDIASIS: ICD-10-CM

## 2023-03-24 DIAGNOSIS — O23.43 RECURRENT URINARY TRACT INFECTION AFFECTING PREGNANCY IN THIRD TRIMESTER: ICD-10-CM

## 2023-03-24 DIAGNOSIS — O09.33 LATE PRENATAL CARE AFFECTING PREGNANCY IN THIRD TRIMESTER: Primary | ICD-10-CM

## 2023-03-24 DIAGNOSIS — O09.33 LATE PRENATAL CARE AFFECTING PREGNANCY IN THIRD TRIMESTER: ICD-10-CM

## 2023-03-24 DIAGNOSIS — O26.13 LOW WEIGHT GAIN DURING PREGNANCY IN THIRD TRIMESTER: ICD-10-CM

## 2023-03-24 DIAGNOSIS — N89.8 VAGINAL IRRITATION: ICD-10-CM

## 2023-03-24 DIAGNOSIS — Z11.59 ENCOUNTER FOR HEPATITIS C SCREENING TEST FOR LOW RISK PATIENT: ICD-10-CM

## 2023-03-24 LAB
ABO + RH BLD: ABNORMAL
BASOPHILS # BLD AUTO: 0.04 K/UL (ref 0–0.2)
BASOPHILS NFR BLD: 0.5 % (ref 0–1.9)
BLD GP AB SCN CELLS X3 SERPL QL: ABNORMAL
BLOOD GROUP ANTIBODIES SERPL: NORMAL
DIFFERENTIAL METHOD: ABNORMAL
EOSINOPHIL # BLD AUTO: 0.4 K/UL (ref 0–0.5)
EOSINOPHIL NFR BLD: 5.5 % (ref 0–8)
ERYTHROCYTE [DISTWIDTH] IN BLOOD BY AUTOMATED COUNT: 12.6 % (ref 11.5–14.5)
GLUCOSE SERPL-MCNC: 119 MG/DL (ref 70–140)
HCT VFR BLD AUTO: 32.5 % (ref 37–48.5)
HGB BLD-MCNC: 10.9 G/DL (ref 12–16)
HIV 1+2 AB+HIV1 P24 AG SERPL QL IA: NORMAL
IMM GRANULOCYTES # BLD AUTO: 0.02 K/UL (ref 0–0.04)
IMM GRANULOCYTES NFR BLD AUTO: 0.3 % (ref 0–0.5)
LYMPHOCYTES # BLD AUTO: 2 K/UL (ref 1–4.8)
LYMPHOCYTES NFR BLD: 27.7 % (ref 18–48)
MCH RBC QN AUTO: 31.4 PG (ref 27–31)
MCHC RBC AUTO-ENTMCNC: 33.5 G/DL (ref 32–36)
MCV RBC AUTO: 94 FL (ref 82–98)
MONOCYTES # BLD AUTO: 0.4 K/UL (ref 0.3–1)
MONOCYTES NFR BLD: 5.3 % (ref 4–15)
NEUTROPHILS # BLD AUTO: 4.5 K/UL (ref 1.8–7.7)
NEUTROPHILS NFR BLD: 60.7 % (ref 38–73)
NRBC BLD-RTO: 0 /100 WBC
PLATELET # BLD AUTO: 186 K/UL (ref 150–450)
PMV BLD AUTO: 11.2 FL (ref 9.2–12.9)
RBC # BLD AUTO: 3.47 M/UL (ref 4–5.4)
RPR SER QL: NORMAL
SPECIMEN OUTDATE: ABNORMAL
WBC # BLD AUTO: 7.33 K/UL (ref 3.9–12.7)

## 2023-03-24 PROCEDURE — 76816 US MFM PROCEDURE (VIEWPOINT): ICD-10-PCS | Mod: 26,S$PBB,, | Performed by: OBSTETRICS & GYNECOLOGY

## 2023-03-24 PROCEDURE — 99999 PR PBB SHADOW E&M-EST. PATIENT-LVL II: ICD-10-PCS | Mod: PBBFAC,,, | Performed by: OBSTETRICS & GYNECOLOGY

## 2023-03-24 PROCEDURE — 87389 HIV-1 AG W/HIV-1&-2 AB AG IA: CPT | Performed by: OBSTETRICS & GYNECOLOGY

## 2023-03-24 PROCEDURE — 99213 OFFICE O/P EST LOW 20 MIN: CPT | Mod: TH,S$PBB,, | Performed by: OBSTETRICS & GYNECOLOGY

## 2023-03-24 PROCEDURE — 86803 HEPATITIS C AB TEST: CPT | Performed by: OBSTETRICS & GYNECOLOGY

## 2023-03-24 PROCEDURE — 86900 BLOOD TYPING SEROLOGIC ABO: CPT | Performed by: OBSTETRICS & GYNECOLOGY

## 2023-03-24 PROCEDURE — 99999 PR PBB SHADOW E&M-EST. PATIENT-LVL II: CPT | Mod: PBBFAC,,, | Performed by: OBSTETRICS & GYNECOLOGY

## 2023-03-24 PROCEDURE — 86787 VARICELLA-ZOSTER ANTIBODY: CPT | Performed by: OBSTETRICS & GYNECOLOGY

## 2023-03-24 PROCEDURE — 86870 RBC ANTIBODY IDENTIFICATION: CPT | Performed by: OBSTETRICS & GYNECOLOGY

## 2023-03-24 PROCEDURE — 82950 GLUCOSE TEST: CPT | Performed by: OBSTETRICS & GYNECOLOGY

## 2023-03-24 PROCEDURE — 99213 PR OFFICE/OUTPT VISIT, EST, LEVL III, 20-29 MIN: ICD-10-PCS | Mod: TH,S$PBB,, | Performed by: OBSTETRICS & GYNECOLOGY

## 2023-03-24 PROCEDURE — 87106 FUNGI IDENTIFICATION YEAST: CPT | Performed by: OBSTETRICS & GYNECOLOGY

## 2023-03-24 PROCEDURE — 87086 URINE CULTURE/COLONY COUNT: CPT | Performed by: OBSTETRICS & GYNECOLOGY

## 2023-03-24 PROCEDURE — 87088 URINE BACTERIA CULTURE: CPT | Performed by: OBSTETRICS & GYNECOLOGY

## 2023-03-24 PROCEDURE — 85025 COMPLETE CBC W/AUTO DIFF WBC: CPT | Performed by: OBSTETRICS & GYNECOLOGY

## 2023-03-24 PROCEDURE — 99212 OFFICE O/P EST SF 10 MIN: CPT | Mod: PBBFAC,TH,PN,25 | Performed by: OBSTETRICS & GYNECOLOGY

## 2023-03-24 PROCEDURE — 87340 HEPATITIS B SURFACE AG IA: CPT | Performed by: OBSTETRICS & GYNECOLOGY

## 2023-03-24 PROCEDURE — 86592 SYPHILIS TEST NON-TREP QUAL: CPT | Performed by: OBSTETRICS & GYNECOLOGY

## 2023-03-24 PROCEDURE — 76816 OB US FOLLOW-UP PER FETUS: CPT | Mod: PBBFAC | Performed by: OBSTETRICS & GYNECOLOGY

## 2023-03-24 RX ORDER — TERCONAZOLE 8 MG/G
CREAM VAGINAL
COMMUNITY
Start: 2023-03-10 | End: 2023-03-24

## 2023-03-24 RX ORDER — TERCONAZOLE 4 MG/G
1 CREAM VAGINAL NIGHTLY
Qty: 45 G | Refills: 1 | Status: SHIPPED | OUTPATIENT
Start: 2023-03-24 | End: 2023-03-31

## 2023-03-24 NOTE — PROGRESS NOTES
Pregnancy dating, labs, ultrasound reports, prenatal testing, and problem list; prior records and results; and available outside records were reviewed and updated in EMR.  Pertinent findings were noted below.    Reason for Visit: Routine Prenatal Visit    31w6d by Estimated Date of Delivery: 23    Blood pressure 110/70, weight 76.3 kg (168 lb 3.4 oz), last menstrual period 2022.    Late prenatal care affecting pregnancy in third trimester    Recurrent urinary tract infection affecting pregnancy in third trimester  -     Urine culture    Vaginal irritation  -     terconazole (TERAZOL 7) 0.4 % Crea; Place 1 applicator vaginally every evening. for 7 days  Dispense: 45 g; Refill: 1       No contractions, bleeding, or loss of fluid.  +fetal movement.  Pt was to have labs today but had to leave for rescheduled u/s.  Will draw labs on RTC.   No dysuria.  Repeat urine culture today.  Taking macrobid nightly.   Still notes vaginal irritation.  Terazol refilled.     labor precautions given  Follow-up: 2 weeks

## 2023-03-25 ENCOUNTER — PATIENT MESSAGE (OUTPATIENT)
Dept: OTHER | Facility: OTHER | Age: 28
End: 2023-03-25
Payer: MEDICAID

## 2023-03-25 LAB
HBV SURFACE AG SERPL QL IA: NORMAL
HCV AB SERPL QL IA: NORMAL

## 2023-03-26 LAB — BACTERIA UR CULT: ABNORMAL

## 2023-03-28 LAB
VARICELLA INTERPRETATION: NEGATIVE
VARICELLA ZOSTER IGG: 123.2 AU/ML

## 2023-03-28 RX ORDER — FLUCONAZOLE 150 MG/1
150 TABLET ORAL ONCE
Qty: 1 TABLET | Refills: 0 | Status: SHIPPED | OUTPATIENT
Start: 2023-03-28 | End: 2023-03-28

## 2023-04-05 ENCOUNTER — PATIENT MESSAGE (OUTPATIENT)
Dept: OBSTETRICS AND GYNECOLOGY | Facility: CLINIC | Age: 28
End: 2023-04-05
Payer: MEDICAID

## 2023-04-15 ENCOUNTER — PATIENT MESSAGE (OUTPATIENT)
Dept: OTHER | Facility: OTHER | Age: 28
End: 2023-04-15
Payer: MEDICAID

## 2023-04-21 ENCOUNTER — PATIENT MESSAGE (OUTPATIENT)
Dept: OBSTETRICS AND GYNECOLOGY | Facility: CLINIC | Age: 28
End: 2023-04-21
Payer: MEDICAID

## 2023-04-28 ENCOUNTER — ROUTINE PRENATAL (OUTPATIENT)
Dept: OBSTETRICS AND GYNECOLOGY | Facility: CLINIC | Age: 28
End: 2023-04-28
Payer: MEDICAID

## 2023-04-28 ENCOUNTER — PROCEDURE VISIT (OUTPATIENT)
Dept: OBSTETRICS AND GYNECOLOGY | Facility: CLINIC | Age: 28
End: 2023-04-28
Payer: MEDICAID

## 2023-04-28 VITALS
DIASTOLIC BLOOD PRESSURE: 77 MMHG | BODY MASS INDEX: 29.33 KG/M2 | SYSTOLIC BLOOD PRESSURE: 124 MMHG | WEIGHT: 170.88 LBS

## 2023-04-28 DIAGNOSIS — Z34.83 ENCOUNTER FOR SUPERVISION OF OTHER NORMAL PREGNANCY IN THIRD TRIMESTER: ICD-10-CM

## 2023-04-28 DIAGNOSIS — O26.899 RH NEGATIVE, ANTEPARTUM: ICD-10-CM

## 2023-04-28 DIAGNOSIS — Z34.83 ENCOUNTER FOR SUPERVISION OF OTHER NORMAL PREGNANCY IN THIRD TRIMESTER: Primary | ICD-10-CM

## 2023-04-28 DIAGNOSIS — Z67.91 RH NEGATIVE, ANTEPARTUM: ICD-10-CM

## 2023-04-28 LAB
BASOPHILS # BLD AUTO: 0.02 K/UL (ref 0–0.2)
BASOPHILS NFR BLD: 0.3 % (ref 0–1.9)
DIFFERENTIAL METHOD: ABNORMAL
EOSINOPHIL # BLD AUTO: 0.1 K/UL (ref 0–0.5)
EOSINOPHIL NFR BLD: 1.7 % (ref 0–8)
ERYTHROCYTE [DISTWIDTH] IN BLOOD BY AUTOMATED COUNT: 13 % (ref 11.5–14.5)
HCT VFR BLD AUTO: 30.9 % (ref 37–48.5)
HGB BLD-MCNC: 10.1 G/DL (ref 12–16)
HIV 1+2 AB+HIV1 P24 AG SERPL QL IA: NORMAL
IMM GRANULOCYTES # BLD AUTO: 0.05 K/UL (ref 0–0.04)
IMM GRANULOCYTES NFR BLD AUTO: 0.7 % (ref 0–0.5)
LYMPHOCYTES # BLD AUTO: 1.6 K/UL (ref 1–4.8)
LYMPHOCYTES NFR BLD: 22.7 % (ref 18–48)
MCH RBC QN AUTO: 30.5 PG (ref 27–31)
MCHC RBC AUTO-ENTMCNC: 32.7 G/DL (ref 32–36)
MCV RBC AUTO: 93 FL (ref 82–98)
MONOCYTES # BLD AUTO: 0.6 K/UL (ref 0.3–1)
MONOCYTES NFR BLD: 8.3 % (ref 4–15)
NEUTROPHILS # BLD AUTO: 4.8 K/UL (ref 1.8–7.7)
NEUTROPHILS NFR BLD: 66.3 % (ref 38–73)
NRBC BLD-RTO: 0 /100 WBC
PLATELET # BLD AUTO: 193 K/UL (ref 150–450)
PMV BLD AUTO: 11.3 FL (ref 9.2–12.9)
RBC # BLD AUTO: 3.31 M/UL (ref 4–5.4)
WBC # BLD AUTO: 7.24 K/UL (ref 3.9–12.7)

## 2023-04-28 PROCEDURE — 87389 HIV-1 AG W/HIV-1&-2 AB AG IA: CPT | Mod: 59 | Performed by: ADVANCED PRACTICE MIDWIFE

## 2023-04-28 PROCEDURE — 99213 PR OFFICE/OUTPT VISIT, EST, LEVL III, 20-29 MIN: ICD-10-PCS | Mod: TH,S$PBB,, | Performed by: ADVANCED PRACTICE MIDWIFE

## 2023-04-28 PROCEDURE — 99999 PR PBB SHADOW E&M-EST. PATIENT-LVL II: ICD-10-PCS | Mod: PBBFAC,,, | Performed by: ADVANCED PRACTICE MIDWIFE

## 2023-04-28 PROCEDURE — 99212 OFFICE O/P EST SF 10 MIN: CPT | Mod: PBBFAC,TH,PN | Performed by: ADVANCED PRACTICE MIDWIFE

## 2023-04-28 PROCEDURE — 99213 OFFICE O/P EST LOW 20 MIN: CPT | Mod: TH,S$PBB,, | Performed by: ADVANCED PRACTICE MIDWIFE

## 2023-04-28 PROCEDURE — 86592 SYPHILIS TEST NON-TREP QUAL: CPT | Performed by: ADVANCED PRACTICE MIDWIFE

## 2023-04-28 PROCEDURE — 85025 COMPLETE CBC W/AUTO DIFF WBC: CPT | Performed by: ADVANCED PRACTICE MIDWIFE

## 2023-04-28 PROCEDURE — 87147 CULTURE TYPE IMMUNOLOGIC: CPT | Performed by: ADVANCED PRACTICE MIDWIFE

## 2023-04-28 PROCEDURE — 99999 PR PBB SHADOW E&M-EST. PATIENT-LVL II: CPT | Mod: PBBFAC,,, | Performed by: ADVANCED PRACTICE MIDWIFE

## 2023-04-28 PROCEDURE — 87081 CULTURE SCREEN ONLY: CPT | Performed by: ADVANCED PRACTICE MIDWIFE

## 2023-04-28 NOTE — PROGRESS NOTES
Reason for visit: Routine Prenatal Visit      HPI:   27 y.o., at 36w6d by Estimated Date of Delivery: 23    In with no c/o    - Contractions: denies  - Bleeding: denies  - Loss of fluid: denies  - Fetal movement: reports good FM, reinforced BID  - Nausea: no  - Vomiting: no  - Headache: no      Reviewed:    Past medical, surgical, social, family, and obstetric history: Reviewed and updated in EMR.  Medications: Reviewed and updated in EMR.  Allergies: Iodinated contrast media    Pregnancy dating, labs, ultrasound reports, prenatal testing, and problem list: Reviewed and updated in EMR.  Outside records: na  Independent interpretation of tests: na  Discussion with another healthcare professional: na      Vitals: /77   Wt 77.5 kg (170 lb 13.7 oz)   LMP 2022 (Approximate)   BMI 29.33 kg/m²     Physical exam:  GENERAL: No acute distress  ABD: Gravid      Assessment and Plan:    Encounter for supervision of other normal pregnancy in third trimester  -     CBC Auto Differential; Future; Expected date: 2023  -     HIV 1/2 Ag/Ab (4th Gen); Future; Expected date: 2023  -     RPR; Future; Expected date: 2023  -     Strep B Screen, Vaginal / Rectal    Rh negative, antepartum         Pt is undecided re BCM- brochure re options provided-discussed option for LARC at delivery     labor precautions given  Follow-up: 1 weeks      I spent a total of 20 minutes on the day of the visit. This includes face to face time and non-face to face time preparing to see the patient (eg, review of tests), Obtaining and/or reviewing separately obtained history, Documenting clinical information in the electronic or other health record, Independently interpreting results and communicating results to the patient/family/caregiver, or Care coordination.

## 2023-04-29 LAB — RPR SER QL: NORMAL

## 2023-05-01 LAB — BACTERIA SPEC AEROBE CULT: ABNORMAL

## 2023-05-05 ENCOUNTER — PATIENT MESSAGE (OUTPATIENT)
Dept: OBSTETRICS AND GYNECOLOGY | Facility: CLINIC | Age: 28
End: 2023-05-05
Payer: MEDICAID

## 2023-05-12 ENCOUNTER — ROUTINE PRENATAL (OUTPATIENT)
Dept: OBSTETRICS AND GYNECOLOGY | Facility: CLINIC | Age: 28
End: 2023-05-12
Payer: MEDICAID

## 2023-05-12 VITALS
SYSTOLIC BLOOD PRESSURE: 110 MMHG | DIASTOLIC BLOOD PRESSURE: 60 MMHG | BODY MASS INDEX: 29.52 KG/M2 | WEIGHT: 171.94 LBS

## 2023-05-12 DIAGNOSIS — O09.33 LATE PRENATAL CARE AFFECTING PREGNANCY IN THIRD TRIMESTER: Primary | ICD-10-CM

## 2023-05-12 DIAGNOSIS — O99.820 GBS (GROUP B STREPTOCOCCUS CARRIER), +RV CULTURE, CURRENTLY PREGNANT: ICD-10-CM

## 2023-05-12 PROCEDURE — 99213 PR OFFICE/OUTPT VISIT, EST, LEVL III, 20-29 MIN: ICD-10-PCS | Mod: TH,S$PBB,, | Performed by: OBSTETRICS & GYNECOLOGY

## 2023-05-12 PROCEDURE — 99999 PR PBB SHADOW E&M-EST. PATIENT-LVL II: ICD-10-PCS | Mod: PBBFAC,,, | Performed by: OBSTETRICS & GYNECOLOGY

## 2023-05-12 PROCEDURE — 99212 OFFICE O/P EST SF 10 MIN: CPT | Mod: PBBFAC,TH,PN | Performed by: OBSTETRICS & GYNECOLOGY

## 2023-05-12 PROCEDURE — 99213 OFFICE O/P EST LOW 20 MIN: CPT | Mod: TH,S$PBB,, | Performed by: OBSTETRICS & GYNECOLOGY

## 2023-05-12 PROCEDURE — 99999 PR PBB SHADOW E&M-EST. PATIENT-LVL II: CPT | Mod: PBBFAC,,, | Performed by: OBSTETRICS & GYNECOLOGY

## 2023-05-15 ENCOUNTER — ANESTHESIA (OUTPATIENT)
Dept: OBSTETRICS AND GYNECOLOGY | Facility: OTHER | Age: 28
End: 2023-05-15
Payer: MEDICAID

## 2023-05-15 ENCOUNTER — ANESTHESIA EVENT (OUTPATIENT)
Dept: OBSTETRICS AND GYNECOLOGY | Facility: OTHER | Age: 28
End: 2023-05-15
Payer: MEDICAID

## 2023-05-15 ENCOUNTER — HOSPITAL ENCOUNTER (INPATIENT)
Facility: OTHER | Age: 28
LOS: 2 days | Discharge: HOME | End: 2023-05-17
Attending: STUDENT IN AN ORGANIZED HEALTH CARE EDUCATION/TRAINING PROGRAM | Admitting: OBSTETRICS & GYNECOLOGY
Payer: MEDICAID

## 2023-05-15 DIAGNOSIS — Z34.90 ENCOUNTER FOR ELECTIVE INDUCTION OF LABOR: Primary | ICD-10-CM

## 2023-05-15 LAB
ABO + RH BLD: NORMAL
BASOPHILS # BLD AUTO: 0.02 K/UL (ref 0–0.2)
BASOPHILS NFR BLD: 0.3 % (ref 0–1.9)
BLD GP AB SCN CELLS X3 SERPL QL: NORMAL
DIFFERENTIAL METHOD: ABNORMAL
EOSINOPHIL # BLD AUTO: 0.1 K/UL (ref 0–0.5)
EOSINOPHIL NFR BLD: 1.2 % (ref 0–8)
ERYTHROCYTE [DISTWIDTH] IN BLOOD BY AUTOMATED COUNT: 12.5 % (ref 11.5–14.5)
HCT VFR BLD AUTO: 32 % (ref 37–48.5)
HGB BLD-MCNC: 10.9 G/DL (ref 12–16)
IMM GRANULOCYTES # BLD AUTO: 0.04 K/UL (ref 0–0.04)
IMM GRANULOCYTES NFR BLD AUTO: 0.5 % (ref 0–0.5)
LYMPHOCYTES # BLD AUTO: 1.8 K/UL (ref 1–4.8)
LYMPHOCYTES NFR BLD: 22.6 % (ref 18–48)
MCH RBC QN AUTO: 30.6 PG (ref 27–31)
MCHC RBC AUTO-ENTMCNC: 34.1 G/DL (ref 32–36)
MCV RBC AUTO: 90 FL (ref 82–98)
MONOCYTES # BLD AUTO: 0.6 K/UL (ref 0.3–1)
MONOCYTES NFR BLD: 8.1 % (ref 4–15)
NEUTROPHILS # BLD AUTO: 5.2 K/UL (ref 1.8–7.7)
NEUTROPHILS NFR BLD: 67.3 % (ref 38–73)
NRBC BLD-RTO: 0 /100 WBC
PLATELET # BLD AUTO: 215 K/UL (ref 150–450)
PMV BLD AUTO: 11.1 FL (ref 9.2–12.9)
RBC # BLD AUTO: 3.56 M/UL (ref 4–5.4)
SPECIMEN OUTDATE: NORMAL
WBC # BLD AUTO: 7.75 K/UL (ref 3.9–12.7)

## 2023-05-15 PROCEDURE — 86900 BLOOD TYPING SEROLOGIC ABO: CPT | Performed by: STUDENT IN AN ORGANIZED HEALTH CARE EDUCATION/TRAINING PROGRAM

## 2023-05-15 PROCEDURE — 25000003 PHARM REV CODE 250: Performed by: STUDENT IN AN ORGANIZED HEALTH CARE EDUCATION/TRAINING PROGRAM

## 2023-05-15 PROCEDURE — 86762 RUBELLA ANTIBODY: CPT | Performed by: STUDENT IN AN ORGANIZED HEALTH CARE EDUCATION/TRAINING PROGRAM

## 2023-05-15 PROCEDURE — 11000001 HC ACUTE MED/SURG PRIVATE ROOM

## 2023-05-15 PROCEDURE — 85025 COMPLETE CBC W/AUTO DIFF WBC: CPT | Performed by: STUDENT IN AN ORGANIZED HEALTH CARE EDUCATION/TRAINING PROGRAM

## 2023-05-15 PROCEDURE — 63600175 PHARM REV CODE 636 W HCPCS

## 2023-05-15 PROCEDURE — 51702 INSERT TEMP BLADDER CATH: CPT

## 2023-05-15 PROCEDURE — 63600175 PHARM REV CODE 636 W HCPCS: Performed by: STUDENT IN AN ORGANIZED HEALTH CARE EDUCATION/TRAINING PROGRAM

## 2023-05-15 PROCEDURE — 72100002 HC LABOR CARE, 1ST 8 HOURS

## 2023-05-15 PROCEDURE — 59409 PRA ETRICAL CARE,VAG DELIV ONLY: ICD-10-PCS | Mod: AA,,, | Performed by: ANESTHESIOLOGY

## 2023-05-15 PROCEDURE — 25000003 PHARM REV CODE 250

## 2023-05-15 PROCEDURE — 59409 OBSTETRICAL CARE: CPT | Mod: AA,,, | Performed by: ANESTHESIOLOGY

## 2023-05-15 PROCEDURE — 72200005 HC VAGINAL DELIVERY LEVEL II

## 2023-05-15 PROCEDURE — 59409 PR OBSTETRICAL CARE,VAG DELIV ONLY: ICD-10-PCS | Mod: GB,,, | Performed by: OBSTETRICS & GYNECOLOGY

## 2023-05-15 PROCEDURE — 27200710 HC EPIDURAL INFUSION PUMP SET: Performed by: ANESTHESIOLOGY

## 2023-05-15 PROCEDURE — 59409 OBSTETRICAL CARE: CPT | Mod: GB,,, | Performed by: OBSTETRICS & GYNECOLOGY

## 2023-05-15 PROCEDURE — 62326 NJX INTERLAMINAR LMBR/SAC: CPT

## 2023-05-15 PROCEDURE — C1751 CATH, INF, PER/CENT/MIDLINE: HCPCS | Performed by: ANESTHESIOLOGY

## 2023-05-15 PROCEDURE — 58300 INSERT INTRAUTERINE DEVICE: CPT | Performed by: OBSTETRICS & GYNECOLOGY

## 2023-05-15 RX ORDER — TRANEXAMIC ACID 10 MG/ML
1000 INJECTION, SOLUTION INTRAVENOUS ONCE AS NEEDED
Status: DISCONTINUED | OUTPATIENT
Start: 2023-05-15 | End: 2023-05-15

## 2023-05-15 RX ORDER — OXYTOCIN/RINGER'S LACTATE 30/500 ML
95 PLASTIC BAG, INJECTION (ML) INTRAVENOUS ONCE AS NEEDED
Status: DISCONTINUED | OUTPATIENT
Start: 2023-05-15 | End: 2023-05-15

## 2023-05-15 RX ORDER — OXYTOCIN/RINGER'S LACTATE 30/500 ML
334 PLASTIC BAG, INJECTION (ML) INTRAVENOUS ONCE
Status: DISCONTINUED | OUTPATIENT
Start: 2023-05-15 | End: 2023-05-15

## 2023-05-15 RX ORDER — OXYTOCIN 10 [USP'U]/ML
10 INJECTION, SOLUTION INTRAMUSCULAR; INTRAVENOUS ONCE AS NEEDED
Status: DISCONTINUED | OUTPATIENT
Start: 2023-05-15 | End: 2023-05-15

## 2023-05-15 RX ORDER — CARBOPROST TROMETHAMINE 250 UG/ML
250 INJECTION, SOLUTION INTRAMUSCULAR
Status: DISCONTINUED | OUTPATIENT
Start: 2023-05-15 | End: 2023-05-17 | Stop reason: HOSPADM

## 2023-05-15 RX ORDER — MISOPROSTOL 200 UG/1
800 TABLET ORAL ONCE AS NEEDED
Status: DISCONTINUED | OUTPATIENT
Start: 2023-05-15 | End: 2023-05-17 | Stop reason: HOSPADM

## 2023-05-15 RX ORDER — OXYTOCIN/RINGER'S LACTATE 30/500 ML
95 PLASTIC BAG, INJECTION (ML) INTRAVENOUS ONCE AS NEEDED
Status: DISCONTINUED | OUTPATIENT
Start: 2023-05-15 | End: 2023-05-17 | Stop reason: HOSPADM

## 2023-05-15 RX ORDER — DOCUSATE SODIUM 100 MG/1
200 CAPSULE, LIQUID FILLED ORAL 2 TIMES DAILY PRN
Status: DISCONTINUED | OUTPATIENT
Start: 2023-05-15 | End: 2023-05-17 | Stop reason: HOSPADM

## 2023-05-15 RX ORDER — SIMETHICONE 80 MG
1 TABLET,CHEWABLE ORAL EVERY 6 HOURS PRN
Status: DISCONTINUED | OUTPATIENT
Start: 2023-05-15 | End: 2023-05-17 | Stop reason: HOSPADM

## 2023-05-15 RX ORDER — OXYTOCIN 10 [USP'U]/ML
10 INJECTION, SOLUTION INTRAMUSCULAR; INTRAVENOUS ONCE AS NEEDED
Status: DISCONTINUED | OUTPATIENT
Start: 2023-05-15 | End: 2023-05-17 | Stop reason: HOSPADM

## 2023-05-15 RX ORDER — DIPHENHYDRAMINE HYDROCHLORIDE 50 MG/ML
25 INJECTION INTRAMUSCULAR; INTRAVENOUS EVERY 4 HOURS PRN
Status: DISCONTINUED | OUTPATIENT
Start: 2023-05-15 | End: 2023-05-17 | Stop reason: HOSPADM

## 2023-05-15 RX ORDER — IBUPROFEN 600 MG/1
600 TABLET ORAL EVERY 6 HOURS
Status: DISCONTINUED | OUTPATIENT
Start: 2023-05-15 | End: 2023-05-17 | Stop reason: HOSPADM

## 2023-05-15 RX ORDER — SODIUM CITRATE AND CITRIC ACID MONOHYDRATE 334; 500 MG/5ML; MG/5ML
30 SOLUTION ORAL ONCE
Status: DISCONTINUED | OUTPATIENT
Start: 2023-05-15 | End: 2023-05-15

## 2023-05-15 RX ORDER — SODIUM CHLORIDE 9 MG/ML
INJECTION, SOLUTION INTRAVENOUS
Status: DISCONTINUED | OUTPATIENT
Start: 2023-05-15 | End: 2023-05-15

## 2023-05-15 RX ORDER — FENTANYL/BUPIVACAINE/NS/PF 2MCG/ML-.1
PLASTIC BAG, INJECTION (ML) INJECTION CONTINUOUS
Status: DISCONTINUED | OUTPATIENT
Start: 2023-05-15 | End: 2023-05-15

## 2023-05-15 RX ORDER — HYDROCODONE BITARTRATE AND ACETAMINOPHEN 10; 325 MG/1; MG/1
1 TABLET ORAL EVERY 4 HOURS PRN
Status: DISCONTINUED | OUTPATIENT
Start: 2023-05-15 | End: 2023-05-17 | Stop reason: HOSPADM

## 2023-05-15 RX ORDER — CARBOPROST TROMETHAMINE 250 UG/ML
250 INJECTION, SOLUTION INTRAMUSCULAR
Status: DISCONTINUED | OUTPATIENT
Start: 2023-05-15 | End: 2023-05-15

## 2023-05-15 RX ORDER — FAMOTIDINE 10 MG/ML
20 INJECTION INTRAVENOUS ONCE
Status: DISCONTINUED | OUTPATIENT
Start: 2023-05-15 | End: 2023-05-15

## 2023-05-15 RX ORDER — BUPIVACAINE HYDROCHLORIDE 2.5 MG/ML
INJECTION, SOLUTION EPIDURAL; INFILTRATION; INTRACAUDAL
Status: DISPENSED
Start: 2023-05-15 | End: 2023-05-16

## 2023-05-15 RX ORDER — CALCIUM CARBONATE 200(500)MG
500 TABLET,CHEWABLE ORAL 3 TIMES DAILY PRN
Status: DISCONTINUED | OUTPATIENT
Start: 2023-05-15 | End: 2023-05-15

## 2023-05-15 RX ORDER — METHYLERGONOVINE MALEATE 0.2 MG/ML
200 INJECTION INTRAVENOUS
Status: DISCONTINUED | OUTPATIENT
Start: 2023-05-15 | End: 2023-05-17 | Stop reason: HOSPADM

## 2023-05-15 RX ORDER — MISOPROSTOL 200 UG/1
800 TABLET ORAL ONCE AS NEEDED
Status: DISCONTINUED | OUTPATIENT
Start: 2023-05-15 | End: 2023-05-15

## 2023-05-15 RX ORDER — TRANEXAMIC ACID 10 MG/ML
1000 INJECTION, SOLUTION INTRAVENOUS ONCE AS NEEDED
Status: DISCONTINUED | OUTPATIENT
Start: 2023-05-15 | End: 2023-05-17 | Stop reason: HOSPADM

## 2023-05-15 RX ORDER — DIPHENOXYLATE HYDROCHLORIDE AND ATROPINE SULFATE 2.5; .025 MG/1; MG/1
1 TABLET ORAL 4 TIMES DAILY PRN
Status: DISCONTINUED | OUTPATIENT
Start: 2023-05-15 | End: 2023-05-17 | Stop reason: HOSPADM

## 2023-05-15 RX ORDER — SODIUM CHLORIDE 0.9 % (FLUSH) 0.9 %
10 SYRINGE (ML) INJECTION
Status: DISCONTINUED | OUTPATIENT
Start: 2023-05-15 | End: 2023-05-17 | Stop reason: HOSPADM

## 2023-05-15 RX ORDER — OXYTOCIN/RINGER'S LACTATE 30/500 ML
0-30 PLASTIC BAG, INJECTION (ML) INTRAVENOUS CONTINUOUS
Status: DISCONTINUED | OUTPATIENT
Start: 2023-05-15 | End: 2023-05-15

## 2023-05-15 RX ORDER — PRENATAL WITH FERROUS FUM AND FOLIC ACID 3080; 920; 120; 400; 22; 1.84; 3; 20; 10; 1; 12; 200; 27; 25; 2 [IU]/1; [IU]/1; MG/1; [IU]/1; MG/1; MG/1; MG/1; MG/1; MG/1; MG/1; UG/1; MG/1; MG/1; MG/1; MG/1
1 TABLET ORAL DAILY
Status: DISCONTINUED | OUTPATIENT
Start: 2023-05-15 | End: 2023-05-17 | Stop reason: HOSPADM

## 2023-05-15 RX ORDER — FENTANYL/BUPIVACAINE/NS/PF 2MCG/ML-.1
PLASTIC BAG, INJECTION (ML) INJECTION
Status: COMPLETED
Start: 2023-05-15 | End: 2023-05-15

## 2023-05-15 RX ORDER — PROCHLORPERAZINE EDISYLATE 5 MG/ML
5 INJECTION INTRAMUSCULAR; INTRAVENOUS EVERY 6 HOURS PRN
Status: DISCONTINUED | OUTPATIENT
Start: 2023-05-15 | End: 2023-05-15

## 2023-05-15 RX ORDER — METHYLERGONOVINE MALEATE 0.2 MG/ML
200 INJECTION INTRAVENOUS
Status: DISCONTINUED | OUTPATIENT
Start: 2023-05-15 | End: 2023-05-15

## 2023-05-15 RX ORDER — OXYTOCIN/RINGER'S LACTATE 30/500 ML
95 PLASTIC BAG, INJECTION (ML) INTRAVENOUS ONCE
Status: DISCONTINUED | OUTPATIENT
Start: 2023-05-15 | End: 2023-05-17 | Stop reason: HOSPADM

## 2023-05-15 RX ORDER — DIPHENHYDRAMINE HCL 25 MG
25 CAPSULE ORAL EVERY 4 HOURS PRN
Status: DISCONTINUED | OUTPATIENT
Start: 2023-05-15 | End: 2023-05-17 | Stop reason: HOSPADM

## 2023-05-15 RX ORDER — OXYTOCIN/RINGER'S LACTATE 30/500 ML
95 PLASTIC BAG, INJECTION (ML) INTRAVENOUS ONCE
Status: DISCONTINUED | OUTPATIENT
Start: 2023-05-15 | End: 2023-05-15

## 2023-05-15 RX ORDER — HYDROCODONE BITARTRATE AND ACETAMINOPHEN 5; 325 MG/1; MG/1
1 TABLET ORAL EVERY 4 HOURS PRN
Status: DISCONTINUED | OUTPATIENT
Start: 2023-05-15 | End: 2023-05-17 | Stop reason: HOSPADM

## 2023-05-15 RX ORDER — LIDOCAINE HYDROCHLORIDE AND EPINEPHRINE 15; 5 MG/ML; UG/ML
INJECTION, SOLUTION EPIDURAL
Status: DISCONTINUED | OUTPATIENT
Start: 2023-05-15 | End: 2023-05-15

## 2023-05-15 RX ORDER — SODIUM CHLORIDE, SODIUM LACTATE, POTASSIUM CHLORIDE, CALCIUM CHLORIDE 600; 310; 30; 20 MG/100ML; MG/100ML; MG/100ML; MG/100ML
INJECTION, SOLUTION INTRAVENOUS CONTINUOUS
Status: DISCONTINUED | OUTPATIENT
Start: 2023-05-15 | End: 2023-05-15

## 2023-05-15 RX ORDER — OXYTOCIN/RINGER'S LACTATE 30/500 ML
334 PLASTIC BAG, INJECTION (ML) INTRAVENOUS ONCE AS NEEDED
Status: DISCONTINUED | OUTPATIENT
Start: 2023-05-15 | End: 2023-05-15

## 2023-05-15 RX ORDER — LIDOCAINE HYDROCHLORIDE 10 MG/ML
10 INJECTION INFILTRATION; PERINEURAL ONCE AS NEEDED
Status: DISCONTINUED | OUTPATIENT
Start: 2023-05-15 | End: 2023-05-15

## 2023-05-15 RX ORDER — MISOPROSTOL 200 UG/1
800 TABLET ORAL
Status: DISCONTINUED | OUTPATIENT
Start: 2023-05-15 | End: 2023-05-17 | Stop reason: HOSPADM

## 2023-05-15 RX ORDER — PROCHLORPERAZINE EDISYLATE 5 MG/ML
5 INJECTION INTRAMUSCULAR; INTRAVENOUS EVERY 6 HOURS PRN
Status: DISCONTINUED | OUTPATIENT
Start: 2023-05-15 | End: 2023-05-17 | Stop reason: HOSPADM

## 2023-05-15 RX ORDER — HYDROCORTISONE 25 MG/G
CREAM TOPICAL 3 TIMES DAILY PRN
Status: DISCONTINUED | OUTPATIENT
Start: 2023-05-15 | End: 2023-05-17 | Stop reason: HOSPADM

## 2023-05-15 RX ORDER — OXYTOCIN/RINGER'S LACTATE 30/500 ML
334 PLASTIC BAG, INJECTION (ML) INTRAVENOUS ONCE AS NEEDED
Status: DISCONTINUED | OUTPATIENT
Start: 2023-05-15 | End: 2023-05-17 | Stop reason: HOSPADM

## 2023-05-15 RX ORDER — ACETAMINOPHEN 325 MG/1
650 TABLET ORAL EVERY 6 HOURS PRN
Status: DISCONTINUED | OUTPATIENT
Start: 2023-05-15 | End: 2023-05-17 | Stop reason: HOSPADM

## 2023-05-15 RX ORDER — ONDANSETRON 8 MG/1
8 TABLET, ORALLY DISINTEGRATING ORAL EVERY 8 HOURS PRN
Status: DISCONTINUED | OUTPATIENT
Start: 2023-05-15 | End: 2023-05-15

## 2023-05-15 RX ORDER — FENTANYL/BUPIVACAINE/NS/PF 2MCG/ML-.1
PLASTIC BAG, INJECTION (ML) INJECTION CONTINUOUS PRN
Status: DISCONTINUED | OUTPATIENT
Start: 2023-05-15 | End: 2023-05-15

## 2023-05-15 RX ORDER — SIMETHICONE 80 MG
1 TABLET,CHEWABLE ORAL 4 TIMES DAILY PRN
Status: DISCONTINUED | OUTPATIENT
Start: 2023-05-15 | End: 2023-05-15

## 2023-05-15 RX ORDER — ONDANSETRON 8 MG/1
8 TABLET, ORALLY DISINTEGRATING ORAL EVERY 8 HOURS PRN
Status: DISCONTINUED | OUTPATIENT
Start: 2023-05-15 | End: 2023-05-17 | Stop reason: HOSPADM

## 2023-05-15 RX ADMIN — LIDOCAINE HYDROCHLORIDE,EPINEPHRINE BITARTRATE 3 ML: 15; .005 INJECTION, SOLUTION EPIDURAL; INFILTRATION; INTRACAUDAL; PERINEURAL at 07:05

## 2023-05-15 RX ADMIN — SODIUM CHLORIDE, POTASSIUM CHLORIDE, SODIUM LACTATE AND CALCIUM CHLORIDE: 600; 310; 30; 20 INJECTION, SOLUTION INTRAVENOUS at 05:05

## 2023-05-15 RX ADMIN — HYDROCODONE BITARTRATE AND ACETAMINOPHEN 1 TABLET: 5; 325 TABLET ORAL at 09:05

## 2023-05-15 RX ADMIN — DEXTROSE MONOHYDRATE 3 MILLION UNITS: 50 INJECTION, SOLUTION INTRAVENOUS at 10:05

## 2023-05-15 RX ADMIN — IBUPROFEN 600 MG: 600 TABLET, FILM COATED ORAL at 07:05

## 2023-05-15 RX ADMIN — Medication 8 ML/HR: at 07:05

## 2023-05-15 RX ADMIN — DOCUSATE SODIUM 200 MG: 100 CAPSULE, LIQUID FILLED ORAL at 09:05

## 2023-05-15 RX ADMIN — DEXTROSE MONOHYDRATE 5 MILLION UNITS: 5 INJECTION INTRAVENOUS at 06:05

## 2023-05-15 RX ADMIN — Medication 4 MILLI-UNITS/MIN: at 08:05

## 2023-05-15 RX ADMIN — LEVONORGESTREL 1 INTRA UTERINE DEVICE: 52 INTRAUTERINE DEVICE INTRAUTERINE at 01:05

## 2023-05-15 NOTE — PROGRESS NOTES
Pregnancy dating, labs, ultrasound reports, prenatal testing, and problem list; prior records and results; and available outside records were reviewed and updated in EMR.  Pertinent findings were noted below.    Reason for Visit: Routine Prenatal Visit    39w2d by Estimated Date of Delivery: 5/20/23    Blood pressure 110/60, weight 78 kg (171 lb 15.3 oz), last menstrual period 08/11/2022.    Late prenatal care affecting pregnancy in third trimester    GBS (group B Streptococcus carrier), +RV culture, currently pregnant       Occasional cramps or contractions, not consistent.  No bleeding or loss of fluid.  +fetal movement.  Labs reviewed and up to date.   GBS+.  Pt counseled on result.  Will need PCN during labor    Labor and fetal movement count precautions given  Follow-up: 1 week

## 2023-05-15 NOTE — H&P
HISTORY AND PHYSICAL                                                OBSTETRICS          Subjective:       Zaheer Serna is a 27 y.o.  female with IUP at 39w2d weeks gestation who presents with IOL.    Patient denies contractions, denies vaginal bleeding, denies LOF.   Fetal Movement: normal.    This IUP is complicated by GBS+, recurrent UTI, Rh- status.    Review of Systems   Constitutional:  Negative for chills and fatigue.   Eyes:  Negative for visual disturbance.   Respiratory:  Negative for cough and shortness of breath.    Cardiovascular:  Negative for chest pain.   Gastrointestinal:  Negative for abdominal pain, nausea and vomiting.   Endocrine: Negative for diabetes.   Genitourinary:  Negative for dysuria and vaginal bleeding.   Musculoskeletal:  Negative for back pain.   Neurological:  Negative for headaches.     PMHx:   Past Medical History:   Diagnosis Date    Asthma     Heart murmur of         PSHx:   Past Surgical History:   Procedure Laterality Date    TONSILLECTOMY      partial tionsillectomy       All:   Review of patient's allergies indicates:   Allergen Reactions    Iodinated contrast media Hives       Meds:   Medications Prior to Admission   Medication Sig Dispense Refill Last Dose    nitrofurantoin, macrocrystal-monohydrate, (MACROBID) 100 MG capsule Take 100 mg by mouth.          SH:   Social History     Socioeconomic History    Marital status: Single   Tobacco Use    Smoking status: Former     Types: Cigarettes    Smokeless tobacco: Never   Substance and Sexual Activity    Alcohol use: Yes     Comment: patient drinks every day sara and daqauri    Drug use: Yes     Types: Marijuana    Sexual activity: Yes       FH:   Family History   Problem Relation Age of Onset    Breast cancer Neg Hx     Colon cancer Neg Hx     Ovarian cancer Neg Hx        OBHx:   OB History    Para Term  AB Living   4 3 2 0 0 3   SAB IAB Ectopic Multiple Live Births   0 0 0 0 3     "  # Outcome Date GA Lbr Roque/2nd Weight Sex Delivery Anes PTL Lv   4 Current            3 Term 11/10/20 39w3d 05:30 / 00:31 3.68 kg (8 lb 1.8 oz) M Vag-Spont EPI N MELE      Name: DIPAK GORMAN      Apgar1: 8  Apgar5: 9   2 Para 09/05/18     Vag-Spont   MELE   1 Term 09/02/16 39w4d  3.435 kg (7 lb 9.2 oz) F Vag-Spont EPI N MELE      Name: EFE GORMAN      Apgar1: 8  Apgar5: 9       Objective:       /75   Pulse 81   Temp 97.7 °F (36.5 °C) (Oral)   Resp 16   Ht 5' 4" (1.626 m)   Wt 78.2 kg (172 lb 6.4 oz)   LMP 08/11/2022 (Approximate)   SpO2 100%   Breastfeeding No   BMI 29.59 kg/m²     Vitals:    05/15/23 0600 05/15/23 0630   BP: 121/71 116/75   Pulse: 86 81   Resp: 16    Temp: 97.7 °F (36.5 °C)    TempSrc: Oral    SpO2: 100% 100%   Weight: 78.2 kg (172 lb 6.4 oz)    Height: 5' 4" (1.626 m)        General: NAD, alert, oriented, cooperative  HEENT: NCAT, EOM grossly intact  Lungs: Normal WOB  Heart: regular rate  Abdomen: gravid, soft, nondistended, nontender, no rebound or guarding  Extremities: no edema    FHT: 130 bpm, moderate BTBV, +accels, -decels; Cat 1 (reassuring)  M[place]yer: q 5mins  Presentation: cephalic by ultrasound    Cervix: 2/70/-3    EFW by Leopold's: 8lbs    Maternal pelvis proven to [id number]    Recent Growth Scan: 1853g (EFW 29%tile/AC 32%) @31w    Lab Review  Blood Type A NEG  GBBS: positive  Rubella: Immune  RPR: nr  HIV: negative  HepB: negative       Assessment:       39w2d weeks gestation with IOL    Active Hospital Problems    Diagnosis  POA    *Encounter for elective induction of labor [Z34.90]  Not Applicable      Resolved Hospital Problems   No resolved problems to display.          Plan:     IOL   Risks, benefits, alternatives and possible complications have been discussed in detail with the patient.   - Consents signed and to chart  - Admit to Labor and Delivery unit  - Induction with pitocin  - Epidural per Anesthesia  - Draw CBC, T&S  - Notify Staff  - " Recheck in 4 hrs or PRN  - Post-Partum Hemorrhage risk - low  - Postpartum lovenox: not indicated    2. Rh-  - Rhogam workup postpartum    3. GBS+  - PCN in labor      Sana Sifuentes MD PGY-3  Obstetrics and Gynecology

## 2023-05-15 NOTE — PLAN OF CARE
This patient has been screened for Social Work discharge planning needs. Based on  documentation in medical record , pt with many missed appointments throughout pregnancy and pt with late prenatal care. Currently there are no discharge planning needs anticipated at this time. Should any discharge planning needs arise, please consult . For urgent needs/consults, contact the  listed below at the number provided.      05/15/23 1521   OB SCREEN   Assessment Type Discharge Planning Assessment   Source of Information health record   Received Prenatal Care Yes, Late   Any indications/suspicions for None   Is this a teen pregnancy No   Is the baby in NICU No   Indication for adoption/[facility] No   Indication for DME/post-acute needs No   HIV (+) No   Any congenital  disorders No   Fetal demise/ death No     HAL Hogue  [phone]

## 2023-05-15 NOTE — PROGRESS NOTES
"LABOR NOTE    S:  Complaints: No.  Epidural working:  yes  Resident to bedside for routine cervical check     O: /77   Pulse 64   Temp 97.9 °F (36.6 °C) (Oral)   Resp 16   Ht 5' 4" (1.626 m)   Wt 78.2 kg (172 lb 6.4 oz)   LMP 2022 (Approximate)   SpO2 99%   Breastfeeding No   BMI 29.59 kg/m²     FHT: 125, mod BTBV, + accels, - decels, Cat 1 (reassuring)  CTX: q 3-4 minutes  SVE: 10/100/+1    TIMELINE:  0630: /-3, starting pit  1130: /-2, AROM cl  1230: /-1  1315: 10/100/+1    PLAN:  Anticipate , staff notified    Estela Stapleton MD  OB/GYN PGY-1        "

## 2023-05-15 NOTE — ANESTHESIA PREPROCEDURE EVALUATION
[facility]  Anesthesia Pre-Operative Evaluation         Patient Name: Zaheer Serna  YOB: 1995  MRN: [id number]    05/15/2023      Zaheer Serna is a 27 y.o. female  @ 39w2d who presents in active labor. IUP complicated by late PNC, Rh negative status, anxiety, and asthma.    She otherwise has no noted bleeding/clotting disorders, significant cardiopulmonary disease, or spinal pathology.    OB History    Para Term  AB Living   4 3 2 0 0 3   SAB IAB Ectopic Multiple Live Births   0 0 0 0 3      # Outcome Date GA Lbr Roque/2nd Weight Sex Delivery Anes PTL Lv   4 Current            3 Term 11/10/20 39w3d 05:30 / 00:31 3.68 kg (8 lb 1.8 oz) M Vag-Spont EPI N MELE   2 Para 18     Vag-Spont   MELE   1 Term 16 39w4d  3.435 kg (7 lb 9.2 oz) F Vag-Spont EPI N MELE       Review of patient's allergies indicates:   Allergen Reactions    Iodinated contrast media Hives       Wt Readings from Last 1 Encounters:   05/15/23 0600 78.2 kg (172 lb 6.4 oz)       BP Readings from Last 3 Encounters:   05/15/23 116/75   23 110/60   23 124/77       Patient Active Problem List   Diagnosis    Rh negative, antepartum    Late prenatal care affecting pregnancy, antepartum    Recurrent urinary tract infection affecting pregnancy, antepartum    GBS (group B Streptococcus carrier), +RV culture, currently pregnant    Encounter for elective induction of labor       Past Surgical History:   Procedure Laterality Date    TONSILLECTOMY      partial tionsillectomy       Social History     Socioeconomic History    Marital status: Single   Tobacco Use    Smoking status: Former     Types: Cigarettes    Smokeless tobacco: Never   Substance and Sexual Activity    Alcohol use: Yes     Comment: patient drinks every day sara and daqauri    Drug use: Yes     Types: Marijuana    Sexual activity: Yes         Chemistry        Component Value Date/Time    NA  136 02/22/2023 1208    K 3.4 (L) 02/22/2023 1208     02/22/2023 1208    CO2 20 (L) 02/22/2023 1208    BUN 5 (L) 02/22/2023 1208    CREATININE 0.6 02/22/2023 1208    GLU 78 02/22/2023 1208        Component Value Date/Time    CALCIUM 7.9 (L) 02/22/2023 1208    ALKPHOS 61 02/22/2023 1208    AST 14 02/22/2023 1208    ALT 11 02/22/2023 1208    BILITOT 0.4 02/22/2023 1208    ESTGFRAFRICA >60.0 11/18/2020 1241    EGFRNONAA >60.0 11/18/2020 1241            Lab Results   Component Value Date    WBC 7.75 05/15/2023    HGB 10.9 (L) 05/15/2023    HCT 32.0 (L) 05/15/2023    MCV 90 05/15/2023     05/15/2023       No results for input(s): PT, INR, PROTIME, APTT in the last 72 hours.            Pre-op Assessment    I have reviewed the Patient Summary Reports.     I have reviewed the Nursing Notes.    I have reviewed the Medications.     Review of Systems  Anesthesia Hx:  No problems with previous Anesthesia   Denies Personal Hx of Anesthesia complications.   Social:  Former Smoker    Hematology/Oncology:  Hematology Normal        EENT/Dental:EENT/Dental Normal   Cardiovascular:  Cardiovascular Normal Exercise tolerance: good     Pulmonary:   Asthma    Renal/:  Renal/ Normal     Hepatic/GI:  Hepatic/GI Normal    Musculoskeletal:  Musculoskeletal Normal    Neurological:  Neurology Normal    Endocrine:  Endocrine Normal    Psych:   anxiety          Physical Exam  General: Well nourished, Alert and Oriented    Airway:  Mallampati: III / II  Mouth Opening: Normal  TM Distance: Normal  Tongue: Normal  Neck ROM: Normal ROM    Dental:  Intact    Chest/Lungs:  Clear to auscultation, Normal Respiratory Rate    Heart:  Rate: Normal  Rhythm: Regular Rhythm  Sounds: Normal        Anesthesia Plan  Type of Anesthesia, risks & benefits discussed:    Anesthesia Type: Gen ETT, Epidural, MAC, Spinal, CSE  Intra-op Monitoring Plan: Standard ASA Monitors  Post Op Pain Control Plan: multimodal analgesia and IV/PO Opioids PRN  Induction:   IV and rapid sequence  Airway Plan: Video, Post-Induction  Informed Consent: Informed consent signed with the Patient and all parties understand the risks and agree with anesthesia plan.  All questions answered.   ASA Score: 2  Day of Surgery Review of History & Physical: H&P Update referred to the surgeon/provider.    Ready For Surgery From Anesthesia Perspective.     .

## 2023-05-15 NOTE — ED PROVIDER NOTES
Encounter Date: 5/15/2023       History   No chief complaint on file.    HPI  Review of patient's allergies indicates:   Allergen Reactions    Iodinated contrast media Hives     Past Medical History:   Diagnosis Date    Asthma     Heart murmur of       Past Surgical History:   Procedure Laterality Date    TONSILLECTOMY      partial tionsillectomy     Family History   Problem Relation Age of Onset    Breast cancer Neg Hx     Colon cancer Neg Hx     Ovarian cancer Neg Hx      Social History     Tobacco Use    Smoking status: Former     Types: Cigarettes    Smokeless tobacco: Never   Substance Use Topics    Alcohol use: Yes     Comment: patient drinks every day sara and daqauri    Drug use: Yes     Types: Marijuana     Review of Systems    Physical Exam     Initial Vitals [05/15/23 0600]   BP Pulse Resp Temp SpO2   121/71 86 16 97.7 °F (36.5 °C) 100 %      MAP       --         Physical Exam    ED Course   Procedures  Labs Reviewed   CBC W/ AUTO DIFFERENTIAL - Abnormal; Notable for the following components:       Result Value    RBC 3.56 (*)     Hemoglobin 10.9 (*)     Hematocrit 32.0 (*)     All other components within normal limits   RUBELLA ANTIBODY, IGG   TYPE & SCREEN               Medications   lactated ringers bolus 1,000 mL (has no administration in time range)   lactated ringers bolus 500 mL (has no administration in time range)   lactated ringers infusion ( Intravenous New Bag 5/15/23 0559)   0.9%  NaCl infusion (has no administration in time range)   oxytocin 30 units in 500 mL lactated ringers infusion (non-titrating) (has no administration in time range)   oxytocin 30 units in 500 mL lactated ringers infusion (non-titrating) (has no administration in time range)   tranexamic acid in NaCl,iso-os IVPB 1,000 mg (has no administration in time range)   ondansetron disintegrating tablet 8 mg (has no administration in time range)   prochlorperazine injection Soln 5 mg (has no administration in time  range)   calcium carbonate 200 mg calcium (500 mg) chewable tablet 500 mg (has no administration in time range)   simethicone chewable tablet 80 mg (has no administration in time range)   LIDOcaine HCL 10 mg/ml (1%) injection 10 mL (has no administration in time range)   oxytocin 30 units in 500 mL lactated ringers infusion (non-titrating) (has no administration in time range)   oxytocin 30 units in 500 mL lactated ringers infusion (non-titrating) (has no administration in time range)   oxytocin injection 10 Units (has no administration in time range)   miSOPROStoL tablet 800 mcg (has no administration in time range)   miSOPROStoL tablet 800 mcg (has no administration in time range)   methylergonovine injection 200 mcg (has no administration in time range)   carboprost injection 250 mcg (has no administration in time range)   tranexamic acid in NaCl,iso-os IVPB 1,000 mg (has no administration in time range)   penicillin G potassium 3 Million Units in dextrose 5 % 100 mL IVPB (0 Million Units Intravenous Stopped 5/15/23 1106)   oxytocin 30 units in 500 mL lactated ringers infusion (titrating) (4 gladys-units/min Intravenous New Bag 5/15/23 0817)   lactated ringers bolus 1,000 mL (has no administration in time range)   fentanyl 2 mcg/mL with BUPivacaine 0.1% in sodium chloride 0.9% Epidural (has no administration in time range)   sodium citrate-citric acid 500-334 mg/5 ml solution 30 mL (has no administration in time range)   famotidine (PF) injection 20 mg (has no administration in time range)   BUPivacaine (PF) 0.25% (2.5 mg/ml) (MARCAINE) 0.25 % (2.5 mg/mL) injection (has no administration in time range)   penicillin G potassium 5 Million Units in dextrose 5 % in water (D5W) 5 % 100 mL IVPB (MB+) (5 Million Units Intravenous New Bag 5/15/23 0612)   fentanyl 2mcg/mL with BUPivacaine 0.1% in sdoium chloride 0.9% Epidural 2 mcg/mL- 0.1 % Soln (  Override pull for Anesthesia 5/15/23 3651)   levonorgestreL (LILETTA) 20.4  "mcg/24 hrs (8 yrs) 52 mg IUD 1 Intra Uterine Device (1 Intra Uterine Device Intrauterine Given 5/15/23 1356)                              Clinical Impression:    ***Please document a Clinical Impression and click the "Refresh" button to refresh your note and automatically pull in before signing.***         "

## 2023-05-15 NOTE — PROGRESS NOTES
LABOR PROGRESS NOTE    S:  MD to bedside for cervical check. Complaints: None.      O: Temp:  [97.7 °F (36.5 °C)] 97.7 °F (36.5 °C)  Pulse:  [81-86] 81  Resp:  [16] 16  SpO2:  [100 %] 100 %  BP: (116-121)/(71-75) 116/75    FHT: 125BPM/-moderate beat to beat variability/-accels/-decels   CTX: q 3 minutes,     Dilation (cm): 4  Effacement (%): 80  Station: -2  Dose(units) Oxytocin: *8 gladys-units/min     ASSESSMENT:   27 y.o.  at 39w2d, IOL  FHT reassuring  Cat 1      TIMELINE  0630: 2/70/-3, starting pit  1130: 4/-2, AROM cl    PLAN:  1. Labor management  -Continue Close Maternal/Fetal Monitoring.   -Pitocin augmentation per protocol,   -Recheck 2-4 hours or PRN    Sana Sifuentes MD PGY-3  Obstetrics and Gynecology

## 2023-05-15 NOTE — PROGRESS NOTES
"LABOR NOTE    S:  Complaints: No.  Epidural working:  yes  Resident to bedside for routine cervical check     O: /77   Pulse 64   Temp 97.9 °F (36.6 °C) (Oral)   Resp 16   Ht 5' 4" (1.626 m)   Wt 78.2 kg (172 lb 6.4 oz)   LMP 08/11/2022 (Approximate)   SpO2 99%   Breastfeeding No   BMI 29.59 kg/m²     FHT: 135, mod BTBV, + accels, - decels, Cat 1 (reassuring)  CTX: q 3-4 minutes  SVE: 6/80/-1    TIMELINE:  0630: 2/70/-3, starting pit  1130: 4/80/-2, AROM cl  1230: 6/80/-1    PLAN:  Continue Close Maternal/Fetal Monitoring  Pitocin Augmentation per protocol  Recheck 2 hours or PRN    Estela Stapleton MD  OB/GYN PGY-1        "

## 2023-05-15 NOTE — ANESTHESIA PROCEDURE NOTES
Epidural    Patient location during procedure: OB   Reason for block: primary anesthetic   Reason for block: labor analgesia requested by patient and obstetrician  Diagnosis: IUP   Start time: 5/15/2023 7:35 AM  Timeout: 5/15/2023 7:27 AM  End time: 5/15/2023 7:40 AM  Surgery related to: vaginal delivery    Staffing  Performing Provider: Cedric Abrams MD  Authorizing Provider: Mario Price III, MD        Preanesthetic Checklist  Completed: patient identified, IV checked, site marked, risks and benefits discussed, surgical consent, monitors and equipment checked, pre-op evaluation, timeout performed, anesthesia consent given, hand hygiene performed and patient being monitored  Preparation  Patient position: sitting  Prep: ChloraPrep  Patient monitoring: ECG, Pulse Ox and Blood Pressure  Reason for block: primary anesthetic   Epidural  Skin Anesthetic: lidocaine 1%  Skin Wheal: 3 mL  Administration type: continuous  Approach: midline  Interspace: L3-4    Injection technique: BEL air  Needle and Epidural Catheter  Needle type: Tuohy   Needle gauge: 17  Needle length: 3.5 inches  Needle insertion depth: 7 cm  Catheter type: GB Environmental  Catheter size: 19 G  Catheter at skin depth: 11 cm  Insertion Attempts: 1  Test dose: 3 mL of lidocaine 1.5% with Epi 1-to-200,000  Additional Documentation: no paresthesia on injection, incremental injection, no significant pain on injection, negative aspiration for heme and CSF, no signs/symptoms of IV or SA injection and no significant complaints from patient  Needle localization: anatomical landmarks  Medications:  Volume per aspiration: 5 mL  Time between aspirations: 5 minutes   Assessment  Ease of block: easy  Patient's tolerance of the procedure: comfortable throughout block and no complaints No inadvertent dural puncture with Tuohy.  Dural puncture not performed with spinal needle

## 2023-05-15 NOTE — L&D DELIVERY NOTE
"[facility] - Labor & Delivery  Vaginal Delivery   Operative Note    SUMMARY     Normal spontaneous vaginal delivery of live infant, was placed on mothers abdomen for skin to skin and bulb suctioning performed.  Infant delivered position SOUMYA over intact perineum.  Nuchal cord: No.    Spontaneous delivery of placenta and IV pitocin given noting good uterine tone.  Bilateral labial lacerations noted. Left laceration was bleeding and repaired  .    Postpartum Liletta placed at fundus with strings previously cut to 12cm    Patient tolerated delivery well. Sponge needle and lap counted correctly x2.    Liletta Details:  Lot: [id number]  Exp: 2026  SN: [id number]    Indications:  (spontaneous vaginal delivery)  Pregnancy complicated by:   Patient Active Problem List   Diagnosis    Rh negative, antepartum    Late prenatal care affecting pregnancy, antepartum    Recurrent urinary tract infection affecting pregnancy, antepartum    GBS (group B Streptococcus carrier), +RV culture, currently pregnant     (spontaneous vaginal delivery)     Admitting GA: 39w2d    Delivery Information for Audi Serna    Birth information:  YOB: 2023   Time of birth: 1:46 PM   Sex: male   Head Delivery Date/Time: 5/15/2023  1:46 PM   Delivery type: Vaginal, Spontaneous   Gestational Age: 39w2d        Delivery Providers    Delivering clinician: Gabriella Steele MD   Provider Role    Estela Stapleton MD Resident    Frida Hudson RN Delivery Nurse    Berenice Schreiber RN Registered Nurse    Vi Fuller, [facility]              Measurements    Weight: 3170 g  Weight (lbs): 6 lb 15.8 oz  Length: 50.2 cm  Length (in): 19.75"  Head circumference: 34.3 cm  Chest circumference: 32.4 cm         Apgars    Living status: Living  Apgars:  1 min.:  5 min.:  10 min.:  15 min.:  20 min.:    Skin color:  1  1       Heart rate:  2  2       Reflex irritability:  2  2       Muscle tone:  2  2       Respiratory effort:  2  2  "      Total:  9  9       Apgars assigned by: BREA HURTADO RN         Operative Delivery    Forceps attempted?: No  Vacuum extractor attempted?: No         Shoulder Dystocia    Shoulder dystocia present?: No           Presentation    Presentation: Vertex  Position: Left Occiput Anterior           Interventions/Resuscitation    Method: Bulb Suctioning, Tactile Stimulation       Cord    Vessels: 3 vessels  Complications: None  Delayed Cord Clamping?: Yes  Cord Clamped Date/Time: 5/15/2023  1:48 PM  Cord Blood Disposition: Sent with Baby  Gases Sent?: No  Stem Cell Collection (by MD): No       Placenta    Placenta delivery date/time: 5/15/2023 1352  Placenta removal: Spontaneous  Placenta appearance: Intact  Placenta disposition: Discarded           Labor Events:       labor: No     Labor Onset Date/Time:         Dilation Complete Date/Time:         Start Pushing Date/Time:         Start Pushing Date/Time:       Rupture Date/Time: 05/15/23  113         Rupture type: ARM (Artificial Rupture)         Fluid Amount:       Fluid Color: Clear               steroids: None     Antibiotics given for GBS: Yes     Induction: oxytocin     Indications for induction:  Elective     Augmentation: amniotomy;oxytocin     Indications for augmentation: Ineffective Contraction Pattern     Labor complications: None     Additional complications:          Cervical ripening:                     Delivery:      Episiotomy: None     Indication for Episiotomy:       Perineal Lacerations: None Repaired:      Periurethral Laceration:   Repaired:     Labial Laceration: bilateral Repaired: Yes   Sulcus Laceration:   Repaired:     Vaginal Laceration:   Repaired:     Cervical Laceration:   Repaired:     Repair suture:       Repair # of packets: 1     Last Value - EBL - Nursing (mL):       Sum - EBL - Nursing (mL): 0     Last Value - EBL - Anesthesia (mL):        Calculated QBL (mL): 100      Vaginal Sweep Performed: Yes     Surgicount Correct:  Yes     Vaginal Packing: No Quantity:       Other providers:       Anesthesia    Method: Epidural          Details (if applicable):  Trial of Labor      Categorization:      Priority:     Indications for :     Incision Type:       Additional  information:  Forceps:    Vacuum:    Breech:    Observed anomalies    Other (Comments):

## 2023-05-16 LAB
ABO + RH BLD: ABNORMAL
BASOPHILS # BLD AUTO: 0.04 K/UL (ref 0–0.2)
BASOPHILS # BLD AUTO: 0.04 K/UL (ref 0–0.2)
BASOPHILS NFR BLD: 0.5 % (ref 0–1.9)
BASOPHILS NFR BLD: 0.5 % (ref 0–1.9)
BLD GP AB SCN CELLS X3 SERPL QL: ABNORMAL
BLOOD GROUP ANTIBODIES SERPL: NORMAL
DIFFERENTIAL METHOD: ABNORMAL
DIFFERENTIAL METHOD: ABNORMAL
EOSINOPHIL # BLD AUTO: 0.2 K/UL (ref 0–0.5)
EOSINOPHIL # BLD AUTO: 0.2 K/UL (ref 0–0.5)
EOSINOPHIL NFR BLD: 2.5 % (ref 0–8)
EOSINOPHIL NFR BLD: 2.5 % (ref 0–8)
ERYTHROCYTE [DISTWIDTH] IN BLOOD BY AUTOMATED COUNT: 12.6 % (ref 11.5–14.5)
ERYTHROCYTE [DISTWIDTH] IN BLOOD BY AUTOMATED COUNT: 12.6 % (ref 11.5–14.5)
FETAL CELL SCN BLD QL ROSETTE: NORMAL
HCT VFR BLD AUTO: 28 % (ref 37–48.5)
HCT VFR BLD AUTO: 28 % (ref 37–48.5)
HGB BLD-MCNC: 9.3 G/DL (ref 12–16)
HGB BLD-MCNC: 9.3 G/DL (ref 12–16)
IMM GRANULOCYTES # BLD AUTO: 0.03 K/UL (ref 0–0.04)
IMM GRANULOCYTES # BLD AUTO: 0.03 K/UL (ref 0–0.04)
IMM GRANULOCYTES NFR BLD AUTO: 0.4 % (ref 0–0.5)
IMM GRANULOCYTES NFR BLD AUTO: 0.4 % (ref 0–0.5)
INJECT RH IG VOL PATIENT: NORMAL ML
LYMPHOCYTES # BLD AUTO: 2 K/UL (ref 1–4.8)
LYMPHOCYTES # BLD AUTO: 2 K/UL (ref 1–4.8)
LYMPHOCYTES NFR BLD: 25.8 % (ref 18–48)
LYMPHOCYTES NFR BLD: 25.8 % (ref 18–48)
MCH RBC QN AUTO: 30.3 PG (ref 27–31)
MCH RBC QN AUTO: 30.3 PG (ref 27–31)
MCHC RBC AUTO-ENTMCNC: 33.2 G/DL (ref 32–36)
MCHC RBC AUTO-ENTMCNC: 33.2 G/DL (ref 32–36)
MCV RBC AUTO: 91 FL (ref 82–98)
MCV RBC AUTO: 91 FL (ref 82–98)
MONOCYTES # BLD AUTO: 0.7 K/UL (ref 0.3–1)
MONOCYTES # BLD AUTO: 0.7 K/UL (ref 0.3–1)
MONOCYTES NFR BLD: 8.9 % (ref 4–15)
MONOCYTES NFR BLD: 8.9 % (ref 4–15)
NEUTROPHILS # BLD AUTO: 4.9 K/UL (ref 1.8–7.7)
NEUTROPHILS # BLD AUTO: 4.9 K/UL (ref 1.8–7.7)
NEUTROPHILS NFR BLD: 61.9 % (ref 38–73)
NEUTROPHILS NFR BLD: 61.9 % (ref 38–73)
NRBC BLD-RTO: 0 /100 WBC
NRBC BLD-RTO: 0 /100 WBC
PLATELET # BLD AUTO: 165 K/UL (ref 150–450)
PLATELET # BLD AUTO: 165 K/UL (ref 150–450)
PMV BLD AUTO: 11 FL (ref 9.2–12.9)
PMV BLD AUTO: 11 FL (ref 9.2–12.9)
RBC # BLD AUTO: 3.07 M/UL (ref 4–5.4)
RBC # BLD AUTO: 3.07 M/UL (ref 4–5.4)
RUBV IGG SER-ACNC: 16.3 IU/ML
RUBV IGG SER-IMP: REACTIVE
WBC # BLD AUTO: 7.9 K/UL (ref 3.9–12.7)
WBC # BLD AUTO: 7.9 K/UL (ref 3.9–12.7)

## 2023-05-16 PROCEDURE — 85025 COMPLETE CBC W/AUTO DIFF WBC: CPT

## 2023-05-16 PROCEDURE — 63600519 RHOGAM PHARM REV CODE 636 ALT 250 W HCPCS

## 2023-05-16 PROCEDURE — 86900 BLOOD TYPING SEROLOGIC ABO: CPT | Performed by: OBSTETRICS & GYNECOLOGY

## 2023-05-16 PROCEDURE — 99232 PR SUBSEQUENT HOSPITAL CARE,LEVL II: ICD-10-PCS | Mod: ,,, | Performed by: OBSTETRICS & GYNECOLOGY

## 2023-05-16 PROCEDURE — 99232 SBSQ HOSP IP/OBS MODERATE 35: CPT | Mod: ,,, | Performed by: OBSTETRICS & GYNECOLOGY

## 2023-05-16 PROCEDURE — 58300 INSERT INTRAUTERINE DEVICE: CPT | Mod: ,,, | Performed by: OBSTETRICS & GYNECOLOGY

## 2023-05-16 PROCEDURE — 58300 PR INSERT INTRAUTERINE DEVICE: ICD-10-PCS | Mod: ,,, | Performed by: OBSTETRICS & GYNECOLOGY

## 2023-05-16 PROCEDURE — 85461 HEMOGLOBIN FETAL: CPT | Performed by: OBSTETRICS & GYNECOLOGY

## 2023-05-16 PROCEDURE — 86870 RBC ANTIBODY IDENTIFICATION: CPT | Performed by: OBSTETRICS & GYNECOLOGY

## 2023-05-16 PROCEDURE — 25000003 PHARM REV CODE 250

## 2023-05-16 PROCEDURE — 11000001 HC ACUTE MED/SURG PRIVATE ROOM

## 2023-05-16 RX ORDER — IBUPROFEN 600 MG/1
600 TABLET ORAL EVERY 6 HOURS PRN
Qty: 30 TABLET | Refills: 1 | Status: SHIPPED | OUTPATIENT
Start: 2023-05-16

## 2023-05-16 RX ORDER — DOCUSATE SODIUM 100 MG/1
100 CAPSULE, LIQUID FILLED ORAL 2 TIMES DAILY
Qty: 30 CAPSULE | Refills: 1 | Status: SHIPPED | OUTPATIENT
Start: 2023-05-16

## 2023-05-16 RX ADMIN — DOCUSATE SODIUM 200 MG: 100 CAPSULE, LIQUID FILLED ORAL at 08:05

## 2023-05-16 RX ADMIN — PRENATAL VIT W/ FE FUMARATE-FA TAB 27-0.8 MG 1 TABLET: 27-0.8 TAB at 08:05

## 2023-05-16 RX ADMIN — IBUPROFEN 600 MG: 600 TABLET, FILM COATED ORAL at 12:05

## 2023-05-16 RX ADMIN — HUMAN RHO(D) IMMUNE GLOBULIN 300 MCG: 300 INJECTION, SOLUTION INTRAMUSCULAR at 02:05

## 2023-05-16 RX ADMIN — IBUPROFEN 600 MG: 600 TABLET, FILM COATED ORAL at 05:05

## 2023-05-16 RX ADMIN — HYDROCODONE BITARTRATE AND ACETAMINOPHEN 1 TABLET: 10; 325 TABLET ORAL at 03:05

## 2023-05-16 RX ADMIN — IBUPROFEN 600 MG: 600 TABLET, FILM COATED ORAL at 06:05

## 2023-05-16 NOTE — ANESTHESIA POSTPROCEDURE EVALUATION
Anesthesia Post Evaluation    Patient: Zaheer Serna    Procedure(s) Performed: * No procedures listed *    Final Anesthesia Type: epidural      Patient location during evaluation: labor & delivery  Patient participation: Yes- Able to Participate  Level of consciousness: awake and alert  Post-procedure vital signs: reviewed and stable  Pain management: adequate  Airway patency: patent  RAFA mitigation strategies: Multimodal analgesia  PONV status at discharge: No PONV  Anesthetic complications: no      Cardiovascular status: blood pressure returned to baseline, hemodynamically stable and stable  Respiratory status: unassisted, room air and spontaneous ventilation  Hydration status: euvolemic  Follow-up not needed.          Vitals Value Taken Time   /79 05/16/23 0818   Temp 36.6 °C (97.9 °F) 05/16/23 0818   Pulse 67 05/16/23 0818   Resp 18 05/16/23 0818   SpO2 97 % 05/16/23 0818         No case tracking events are documented in the log.      Pain/Flora Score: Pain Rating Prior to Med Admin: 3 (5/16/2023  6:22 AM)  Pain Rating Post Med Admin: 6 (5/16/2023  4:39 AM)

## 2023-05-16 NOTE — PLAN OF CARE
Problem: Adult Inpatient Plan of Care  Goal: Plan of Care Review  Outcome: Ongoing, Progressing   Pt free from falls, injury or any further trauma throughout shift. VSS. Fundus midline and firm, moderate lochia rubra. Continued medications as ordered. Pain adequately controlled with medications. Assistance with feeding provided as needed. Pt in no distress. Will cont to monitor.

## 2023-05-17 VITALS
TEMPERATURE: 98 F | RESPIRATION RATE: 18 BRPM | WEIGHT: 172.38 LBS | BODY MASS INDEX: 29.43 KG/M2 | HEIGHT: 64 IN | OXYGEN SATURATION: 97 % | HEART RATE: 79 BPM | SYSTOLIC BLOOD PRESSURE: 124 MMHG | DIASTOLIC BLOOD PRESSURE: 71 MMHG

## 2023-05-17 PROCEDURE — 99238 HOSP IP/OBS DSCHRG MGMT 30/<: CPT | Mod: ,,, | Performed by: OBSTETRICS & GYNECOLOGY

## 2023-05-17 PROCEDURE — 25000003 PHARM REV CODE 250

## 2023-05-17 PROCEDURE — 99238 PR HOSPITAL DISCHARGE DAY,<30 MIN: ICD-10-PCS | Mod: ,,, | Performed by: OBSTETRICS & GYNECOLOGY

## 2023-05-17 RX ORDER — HYDROCODONE BITARTRATE AND ACETAMINOPHEN 5; 325 MG/1; MG/1
1 TABLET ORAL EVERY 6 HOURS PRN
Qty: 10 TABLET | Refills: 0 | Status: SHIPPED | OUTPATIENT
Start: 2023-05-17

## 2023-05-17 RX ORDER — FERROUS SULFATE 325(65) MG
325 TABLET, DELAYED RELEASE (ENTERIC COATED) ORAL DAILY
Qty: 30 TABLET | Refills: 0 | Status: SHIPPED | OUTPATIENT
Start: 2023-05-17

## 2023-05-17 RX ADMIN — HYDROCODONE BITARTRATE AND ACETAMINOPHEN 1 TABLET: 10; 325 TABLET ORAL at 07:05

## 2023-05-17 RX ADMIN — PRENATAL VIT W/ FE FUMARATE-FA TAB 27-0.8 MG 1 TABLET: 27-0.8 TAB at 08:05

## 2023-05-17 RX ADMIN — IBUPROFEN 600 MG: 600 TABLET, FILM COATED ORAL at 05:05

## 2023-05-17 RX ADMIN — HYDROCODONE BITARTRATE AND ACETAMINOPHEN 1 TABLET: 5; 325 TABLET ORAL at 12:05

## 2023-05-17 RX ADMIN — IBUPROFEN 600 MG: 600 TABLET, FILM COATED ORAL at 12:05

## 2023-05-17 NOTE — MEDICAL/APP STUDENT
POSTPARTUM PROGRESS NOTE    Subjective:     PPD/POD#: 2   Procedure:    EGA: 39w2d   N/V: No   F/C: No   Abd Pain: Mild, well-controlled with oral pain medication   Lochia: Mild   Voiding: Yes   Ambulating: Yes   Bowel fnc: Yes   Breastfeeding: No   Contraception: Immediate post-placental Liletta IUD placed   Circumcision: Declined by OB attending     Objective:      Temp:  [97.9 °F (36.6 °C)-98.9 °F (37.2 °C)] 98.9 °F (37.2 °C)  Pulse:  [67-73] 70  Resp:  [16-18] 16  SpO2:  [97 %-100 %] 99 %  BP: (118-125)/(79-90) 121/79    Lung: Normal respiratory effort   Abdomen: Soft, appropriately tender   Uterus: Firm, no fundal tenderness   Incision: N/A   : Deferred   Extremities: Bilateral trace edema     Lab Review    No results for input(s): NA, K, CL, CO2, BUN, CREATININE, GLU, PROT, BILITOT, ALKPHOS, ALT, AST, MG, PHOS in the last 168 hours.    Recent Labs   Lab 05/15/23  0552 23  0534   WBC 7.75 7.90  7.90   HGB 10.9* 9.3*  9.3*   HCT 32.0* 28.0*  28.0*   MCV 90 91  91    165  165         I/O  No intake or output data in the 24 hours ending 23 0601     Assessment and Plan:   Postpartum care:  - Patient doing well.  - Continue routine management and advances.    Rh negative  - Will need postpartum rhogam work up  - Baby: AB pos    Anemia   - H/H as above  - QBL: 100mL  - asymptomatic  - iron/colace

## 2023-05-17 NOTE — DISCHARGE SUMMARY
Delivery Discharge Summary  Obstetrics      Primary OB Clinician: CHARLES Ash MD      Admission date: 5/15/2023  Discharge date: 2023    Disposition: To home, self care    Discharge Diagnosis List:      Patient Active Problem List   Diagnosis    Rh negative, antepartum    Late prenatal care affecting pregnancy, antepartum    Recurrent urinary tract infection affecting pregnancy, antepartum    GBS (group B Streptococcus carrier), +RV culture, currently pregnant     (spontaneous vaginal delivery)       Procedure:     Hospital Course:  Zaheer Serna is a 27 y.o. now , PPD #2 who was admitted on 5/15/2023 at 39w2d for IOL. Pregnancy was complicated by GBS+, recurrent UTI, Rh- status. Patient was subsequently admitted to labor and delivery unit with signed consents.     Labor course was uncomplicated and resulted in  without complications.     Please see delivery note for further details. Her postpartum course was complicated by anemia. On discharge day, patient's pain is controlled with oral pain medications. Pt is tolerating ambulation without SOB or CP, and regular diet without N/V. Reports lochia is mild. Denies any HA, vision changes, F/C, LE swelling. Denies any breast pain/soreness.    Pt in stable condition and ready for discharge. She has been instructed to start and/or continue medications and follow up with her obstetrics provider as listed below.    Pertinent studies:  CBC  Recent Labs   Lab 05/15/23  0552 23  0534   WBC 7.75 7.90  7.90   HGB 10.9* 9.3*  9.3*   HCT 32.0* 28.0*  28.0*   MCV 90 91  91    165  165        Immunization History   Administered Date(s) Administered    Influenza - Trivalent (ADULT) 2016    Rho (D) Immune Globulin - IM 06/15/2016, 2016, 2020, 2020, 03/10/2023, 2023    Tdap 2016        Delivery:    Episiotomy: None   Lacerations: None   Repair suture:     Repair # of packets: 1   Blood loss  (ml):       Birth information:  YOB: 2023   Time of birth: 1:46 PM   Sex: male   Delivery type: Vaginal, Spontaneous   Gestational Age: 39w2d    Delivery Clinician:      Other providers:       Additional  information:  Forceps:    Vacuum:    Breech:    Observed anomalies      Living?:           APGARS  One minute Five minutes Ten minutes   Skin color:         Heart rate:         Grimace:         Muscle tone:         Breathing:         Totals: 9  9        Placenta: Delivered:       appearance  Patient Instructions:   Current Discharge Medication List        START taking these medications    Details   docusate sodium (COLACE) 100 MG capsule Take 1 capsule (100 mg total) by mouth 2 (two) times daily.  Qty: 30 capsule, Refills: 1      ferrous sulfate 325 (65 FE) MG EC tablet Take 1 tablet (325 mg total) by mouth once daily.  Qty: 30 tablet, Refills: 0      HYDROcodone-acetaminophen (NORCO) 5-325 mg per tablet Take 1 tablet by mouth every 6 (six) hours as needed for Pain.  Qty: 10 tablet, Refills: 0    Comments: Quantity prescribed more than 7 day supply? No      ibuprofen (ADVIL,MOTRIN) 600 MG tablet Take 1 tablet (600 mg total) by mouth every 6 (six) hours as needed for Pain.  Qty: 30 tablet, Refills: 1           STOP taking these medications       nitrofurantoin, macrocrystal-monohydrate, (MACROBID) 100 MG capsule Comments:   Reason for Stopping:               Discharge Procedure Orders   Diet Adult Regular     Lifting restrictions   Order Comments: No lifting more than infant for six weeks.     Other restrictions (specify):     No driving until:   Order Comments: Comfortable stepping on gas and brakes     Pelvic Rest   Order Comments: Nothing in vagina, including intercourse, for at least six weeks     Notify your health care provider if you experience any of the following:  temperature >100.4     Notify your health care provider if you experience any of the following:  persistent nausea and vomiting  or diarrhea     Notify your health care provider if you experience any of the following:  severe uncontrolled pain     Notify your health care provider if you experience any of the following:  redness, tenderness, or signs of infection (pain, swelling, redness, odor or green/yellow discharge around incision site)     Notify your health care provider if you experience any of the following:  severe persistent headache     Notify your health care provider if you experience any of the following:  persistent dizziness, light-headedness, or visual disturbances     Notify your health care provider if you experience any of the following:  increased confusion or weakness     Notify your health care provider if you experience any of the following:   Order Comments: Heavy vaginal bleeding, saturating more than two pads in one hour     Activity as tolerated        Follow-up Information       J Dain Ash MD Follow up in 6 week(s).    Specialties: Obstetrics and Gynecology, Obstetrics, Gynecology  Why: Post partum check  Contact information:  [address] [address]  [phone]                              Estela Stapleton MD  OB/GYN PGY-1

## 2023-05-18 ENCOUNTER — PATIENT MESSAGE (OUTPATIENT)
Dept: OBSTETRICS AND GYNECOLOGY | Facility: OTHER | Age: 28
End: 2023-05-18
Payer: MEDICAID

## 2023-06-05 ENCOUNTER — PATIENT MESSAGE (OUTPATIENT)
Dept: OBSTETRICS AND GYNECOLOGY | Facility: CLINIC | Age: 28
End: 2023-06-05
Payer: MEDICAID

## 2023-06-11 NOTE — PROGRESS NOTES
Lab Documentation:    Order Type: Written Order placed in [facility]    Patient in for lab visit only per provider treatment plan.

## 2023-06-12 PROBLEM — O23.40 RECURRENT URINARY TRACT INFECTION AFFECTING PREGNANCY, ANTEPARTUM: Status: RESOLVED | Noted: 2023-03-10 | Resolved: 2023-06-12

## 2024-07-01 ENCOUNTER — HOSPITAL ENCOUNTER (EMERGENCY)
Facility: OTHER | Age: 29
Discharge: HOME | End: 2024-07-01
Attending: EMERGENCY MEDICINE

## 2024-07-01 VITALS
WEIGHT: 192 LBS | BODY MASS INDEX: 32.78 KG/M2 | OXYGEN SATURATION: 99 % | RESPIRATION RATE: 18 BRPM | SYSTOLIC BLOOD PRESSURE: 146 MMHG | TEMPERATURE: 99 F | HEART RATE: 87 BPM | HEIGHT: 64 IN | DIASTOLIC BLOOD PRESSURE: 94 MMHG

## 2024-07-01 DIAGNOSIS — R82.71 ASYMPTOMATIC BACTERIURIA: ICD-10-CM

## 2024-07-01 DIAGNOSIS — O46.90 VAGINAL BLEEDING IN PREGNANCY: Primary | ICD-10-CM

## 2024-07-01 DIAGNOSIS — O36.80X0 PREGNANCY WITH INCONCLUSIVE FETAL VIABILITY, SINGLE OR UNSPECIFIED FETUS: ICD-10-CM

## 2024-07-01 LAB
ABO + RH BLD: NORMAL
ALBUMIN SERPL BCP-MCNC: 3.9 G/DL (ref 3.5–5.2)
ALP SERPL-CCNC: 44 U/L (ref 55–135)
ALT SERPL W/O P-5'-P-CCNC: 13 U/L (ref 10–44)
ANION GAP SERPL CALC-SCNC: 9 MMOL/L (ref 8–16)
AST SERPL-CCNC: 16 U/L (ref 10–40)
B-HCG UR QL: POSITIVE
BACTERIA #/AREA URNS HPF: ABNORMAL /HPF
BASOPHILS # BLD AUTO: 0.04 K/UL (ref 0–0.2)
BASOPHILS NFR BLD: 0.6 % (ref 0–1.9)
BILIRUB SERPL-MCNC: 0.5 MG/DL (ref 0.1–1)
BILIRUB UR QL STRIP: NEGATIVE
BLD GP AB SCN CELLS X3 SERPL QL: NORMAL
BUN SERPL-MCNC: 8 MG/DL (ref 6–20)
CALCIUM SERPL-MCNC: 9.6 MG/DL (ref 8.7–10.5)
CHLORIDE SERPL-SCNC: 105 MMOL/L (ref 95–110)
CLARITY UR: CLEAR
CO2 SERPL-SCNC: 22 MMOL/L (ref 23–29)
COLOR UR: YELLOW
CREAT SERPL-MCNC: 0.8 MG/DL (ref 0.5–1.4)
CTP QC/QA: YES
DIFFERENTIAL METHOD BLD: ABNORMAL
EOSINOPHIL # BLD AUTO: 0.1 K/UL (ref 0–0.5)
EOSINOPHIL NFR BLD: 0.7 % (ref 0–8)
ERYTHROCYTE [DISTWIDTH] IN BLOOD BY AUTOMATED COUNT: 13 % (ref 11.5–14.5)
EST. GFR  (NO RACE VARIABLE): >60 ML/MIN/1.73 M^2
GLUCOSE SERPL-MCNC: 78 MG/DL (ref 70–110)
GLUCOSE UR QL STRIP: NEGATIVE
HCG INTACT+B SERPL-ACNC: NORMAL MIU/ML
HCT VFR BLD AUTO: 39.6 % (ref 37–48.5)
HCV AB SERPL QL IA: NEGATIVE
HGB BLD-MCNC: 13.6 G/DL (ref 12–16)
HGB UR QL STRIP: ABNORMAL
HIV 1+2 AB+HIV1 P24 AG SERPL QL IA: NEGATIVE
IMM GRANULOCYTES # BLD AUTO: 0.01 K/UL (ref 0–0.04)
IMM GRANULOCYTES NFR BLD AUTO: 0.1 % (ref 0–0.5)
INJECT RH IG VOL PATIENT: NORMAL ML
KETONES UR QL STRIP: NEGATIVE
LEUKOCYTE ESTERASE UR QL STRIP: ABNORMAL
LYMPHOCYTES # BLD AUTO: 1.8 K/UL (ref 1–4.8)
LYMPHOCYTES NFR BLD: 25.3 % (ref 18–48)
MCH RBC QN AUTO: 31.3 PG (ref 27–31)
MCHC RBC AUTO-ENTMCNC: 34.3 G/DL (ref 32–36)
MCV RBC AUTO: 91 FL (ref 82–98)
MICROSCOPIC COMMENT: ABNORMAL
MONOCYTES # BLD AUTO: 0.7 K/UL (ref 0.3–1)
MONOCYTES NFR BLD: 10.3 % (ref 4–15)
NEUTROPHILS # BLD AUTO: 4.4 K/UL (ref 1.8–7.7)
NEUTROPHILS NFR BLD: 63 % (ref 38–73)
NITRITE UR QL STRIP: NEGATIVE
NRBC BLD-RTO: 0 /100 WBC
PH UR STRIP: 7 [PH] (ref 5–8)
PLATELET # BLD AUTO: 224 K/UL (ref 150–450)
PMV BLD AUTO: 10.4 FL (ref 9.2–12.9)
POTASSIUM SERPL-SCNC: 4 MMOL/L (ref 3.5–5.1)
PROT SERPL-MCNC: 8.2 G/DL (ref 6–8.4)
PROT UR QL STRIP: ABNORMAL
RBC # BLD AUTO: 4.35 M/UL (ref 4–5.4)
RBC #/AREA URNS HPF: 6 /HPF (ref 0–4)
SODIUM SERPL-SCNC: 136 MMOL/L (ref 136–145)
SP GR UR STRIP: 1.02 (ref 1–1.03)
SQUAMOUS #/AREA URNS HPF: 3 /HPF
UNIDENT CRYS URNS QL MICRO: ABNORMAL
URN SPEC COLLECT METH UR: ABNORMAL
UROBILINOGEN UR STRIP-ACNC: NEGATIVE EU/DL
WBC # BLD AUTO: 6.97 K/UL (ref 3.9–12.7)
WBC #/AREA URNS HPF: 12 /HPF (ref 0–5)

## 2024-07-01 PROCEDURE — 80053 COMPREHEN METABOLIC PANEL: CPT | Performed by: PHYSICIAN ASSISTANT

## 2024-07-01 PROCEDURE — 87389 HIV-1 AG W/HIV-1&-2 AB AG IA: CPT | Performed by: EMERGENCY MEDICINE

## 2024-07-01 PROCEDURE — 85025 COMPLETE CBC W/AUTO DIFF WBC: CPT | Performed by: PHYSICIAN ASSISTANT

## 2024-07-01 PROCEDURE — 63600519 RHOGAM PHARM REV CODE 636 ALT 250 W HCPCS: Performed by: PHYSICIAN ASSISTANT

## 2024-07-01 PROCEDURE — 81025 URINE PREGNANCY TEST: CPT | Performed by: NURSE PRACTITIONER

## 2024-07-01 PROCEDURE — 84702 CHORIONIC GONADOTROPIN TEST: CPT | Performed by: PHYSICIAN ASSISTANT

## 2024-07-01 PROCEDURE — 87088 URINE BACTERIA CULTURE: CPT | Performed by: EMERGENCY MEDICINE

## 2024-07-01 PROCEDURE — 86850 RBC ANTIBODY SCREEN: CPT | Performed by: PHYSICIAN ASSISTANT

## 2024-07-01 PROCEDURE — 87077 CULTURE AEROBIC IDENTIFY: CPT | Performed by: EMERGENCY MEDICINE

## 2024-07-01 PROCEDURE — 87086 URINE CULTURE/COLONY COUNT: CPT | Performed by: EMERGENCY MEDICINE

## 2024-07-01 PROCEDURE — 87186 SC STD MICRODIL/AGAR DIL: CPT | Performed by: EMERGENCY MEDICINE

## 2024-07-01 PROCEDURE — 81000 URINALYSIS NONAUTO W/SCOPE: CPT | Performed by: EMERGENCY MEDICINE

## 2024-07-01 PROCEDURE — 96372 THER/PROPH/DIAG INJ SC/IM: CPT | Performed by: PHYSICIAN ASSISTANT

## 2024-07-01 PROCEDURE — 99284 EMERGENCY DEPT VISIT MOD MDM: CPT | Mod: 25

## 2024-07-01 PROCEDURE — 86803 HEPATITIS C AB TEST: CPT | Performed by: EMERGENCY MEDICINE

## 2024-07-01 PROCEDURE — 86900 BLOOD TYPING SEROLOGIC ABO: CPT | Performed by: PHYSICIAN ASSISTANT

## 2024-07-01 PROCEDURE — 86901 BLOOD TYPING SEROLOGIC RH(D): CPT | Performed by: PHYSICIAN ASSISTANT

## 2024-07-01 RX ORDER — NITROFURANTOIN 25; 75 MG/1; MG/1
100 CAPSULE ORAL 2 TIMES DAILY
Qty: 10 CAPSULE | Refills: 0 | Status: SHIPPED | OUTPATIENT
Start: 2024-07-01 | End: 2024-07-01

## 2024-07-01 RX ORDER — NITROFURANTOIN 25; 75 MG/1; MG/1
100 CAPSULE ORAL 2 TIMES DAILY
Qty: 10 CAPSULE | Refills: 0 | Status: SHIPPED | OUTPATIENT
Start: 2024-07-01 | End: 2024-07-06

## 2024-07-01 RX ADMIN — HUMAN RHO(D) IMMUNE GLOBULIN 300 MCG: 300 INJECTION, SOLUTION INTRAMUSCULAR at 07:07

## 2024-07-01 NOTE — ED NOTES
NO IV access at this time. IV attempt x1. Labs collected. Spoke with provider. States no IV access warranted at this time.

## 2024-07-01 NOTE — ED PROVIDER NOTES
Encounter Date: 2024       History     Chief Complaint   Patient presents with    Vaginal Bleeding     Pt states she was sent here from [facility] for ultrasound, paperwork states that she needs to follow up with OB but she is requesting ultrasound. She has light spotting x1 week, no abdominal cramping.     28-year-old  approximately 7 weeks pregnant female presents ER for evaluation of vaginal bleeding.  Patient reports 1 week history of light spotting, noted only on tissue paper when wiping.  She has not had to use a pad or tampon.  She states she had an ultrasound done at a clinic earlier today that showed fetal pole however no heart rate.  She states she was told to come to the emergency room for further evaluation.  She denies any abdominal cramping or pain otherwise.  No flank pain or UTI symptoms.  Patient denies any fever chills.  No lightheadedness or dizziness.    The history is provided by the patient.     Review of patient's allergies indicates:   Allergen Reactions    Iodinated contrast media Hives     Past Medical History:   Diagnosis Date    Asthma     Heart murmur of       Past Surgical History:   Procedure Laterality Date    TONSILLECTOMY      partial tionsillectomy     Family History   Problem Relation Name Age of Onset    Breast cancer Neg Hx      Colon cancer Neg Hx      Ovarian cancer Neg Hx       Social History     Tobacco Use    Smoking status: Former     Types: Cigarettes    Smokeless tobacco: Never   Substance Use Topics    Alcohol use: Yes     Comment: patient drinks every day sara and daqauri    Drug use: Yes     Types: Marijuana     Review of Systems   Constitutional:  Negative for chills and fever.   HENT:  Positive for congestion.    Eyes:  Negative for visual disturbance.   Respiratory:  Negative for shortness of breath.    Cardiovascular:  Negative for chest pain.   Gastrointestinal:  Negative for abdominal pain, nausea and vomiting.   Genitourinary:   Positive for vaginal bleeding (spotting). Negative for dysuria and flank pain.   Musculoskeletal:  Negative for myalgias.   Skin:  Negative for rash.   Allergic/Immunologic: Negative for immunocompromised state.   Neurological:  Negative for weakness and numbness.   Hematological:  Does not bruise/bleed easily.   Psychiatric/Behavioral:  Negative for confusion.        Physical Exam     Initial Vitals [07/01/24 1602]   BP Pulse Resp Temp SpO2   135/74 90 18 98.2 °F (36.8 °C) 99 %      MAP       --         Physical Exam    Vitals reviewed.  Constitutional: She appears well-developed and well-nourished. She is not diaphoretic. No distress.   HENT:   Head: Normocephalic and atraumatic.   Eyes: Conjunctivae and EOM are normal.   Neck: Neck supple.   Cardiovascular:  Intact distal pulses.           Pulmonary/Chest: No respiratory distress.   Abdominal: There is no abdominal tenderness.   No right CVA tenderness.  No left CVA tenderness.   Genitourinary: Cervix exhibits no motion tenderness and no discharge. Right adnexum displays no mass.    Vaginal bleeding (scant) present.      No vaginal discharge.   There is bleeding (scant) in the vagina.   Musculoskeletal:         General: Normal range of motion.      Cervical back: Neck supple.     Neurological: She is alert and oriented to person, place, and time.   Skin: Skin is warm.         ED Course   Procedures  Labs Reviewed   CBC W/ AUTO DIFFERENTIAL - Abnormal; Notable for the following components:       Result Value    MCH 31.3 (*)     All other components within normal limits    Narrative:     Release to patient->Immediate   COMPREHENSIVE METABOLIC PANEL - Abnormal; Notable for the following components:    CO2 22 (*)     Alkaline Phosphatase 44 (*)     All other components within normal limits    Narrative:     Release to patient->Immediate   URINALYSIS, REFLEX TO URINE CULTURE - Abnormal; Notable for the following components:    Protein, UA Trace (*)     Occult Blood UA  1+ (*)     Leukocytes, UA 1+ (*)     All other components within normal limits    Narrative:     Specimen Source->Urine   URINALYSIS MICROSCOPIC - Abnormal; Notable for the following components:    RBC, UA 6 (*)     WBC, UA 12 (*)     Bacteria Many (*)     All other components within normal limits    Narrative:     Specimen Source->Urine   POCT URINE PREGNANCY - Abnormal; Notable for the following components:    POC Preg Test, Ur Positive (*)     All other components within normal limits   CULTURE, URINE   HCG, QUANTITATIVE    Narrative:     Release to patient->Immediate   HIV 1 / 2 ANTIBODY    Narrative:     Release to patient->Immediate   HEPATITIS C ANTIBODY    Narrative:     Release to patient->Immediate   GROUP & RH   INDIRECT ANTIGLOBULIN TEST   PREPARE RH IMMUNE GLOBULIN          Imaging Results              US OB <14 Wks TransAbd & TransVag, Single Gestation (XPD) (Final result)  Result time 07/01/24 18:34:42      Final result by Reji Yip MD (07/01/24 18:34:42)                   Impression:      Pregnancy of unknown viability.  Small intrauterine gestational sac is seen which would correspond with estimated gestational age of 6 weeks 3 days, although no fetal pole is identified at this time.  Recommend serial beta HCGs and repeat pelvic ultrasound in 1-2 weeks or as clinically indicated.      Electronically signed by: Reji Yip MD  Date:    07/01/2024  Time:    18:34               Narrative:    EXAMINATION:  US OB <14 WEEKS, TRANSABDOM & TRANSVAG, SINGLE GESTATION (XPD)    CLINICAL HISTORY:  vaginal bleeding;    TECHNIQUE:  Transabdominal sonography of the pelvis was performed, followed by transvaginal sonography to better evaluate the uterus and ovaries.    COMPARISON:  None.    FINDINGS:  The uterus measures 10 x 7 x 7 cm. Uterine parenchyma is heterogenous in echotexture. In the uterine cavity there is a gestational sac with a mean diameter of 1.5 cm which would correspond with estimated  gestational age of 6 weeks 3 days.  No fetal pole is seen.  The yolk sac measures 0.3 cm.  Small amount of adjacent subchorionic hemorrhage is seen.    The right ovary measures 4 x 2 x 4 cm. The left ovary measures 3 x 2 x 4 cm. Arterial and venous flow are preserved bilaterally.  No adnexal abnormalities are seen.  No significant free fluid is seen.                                       Medications   rho(D) immune globulin (RHOGAM/HYPERRHO) 1,500 unit (300 mcg) syringe (300 mcg Intramuscular Given 7/1/24 1955)     Medical Decision Making  Differential diagnosis:    Ectopic pregnancy, threatened miscarriage, UTI, pyelonephritis, pregnancy of unknown viability, subchorionic hemorrhage    Amount and/or Complexity of Data Reviewed  Labs: ordered. Decision-making details documented in ED Course.  Radiology: ordered. Decision-making details documented in ED Course.    Risk  Prescription drug management.         APC / Resident Notes:   Patient seen in the ER promptly upon arrival.  She is afebrile, no acute distress.  Physical examination fairly unremarkable.  Abdomen is soft, nondistended, nontender.  No CVA tenderness on exam.   examination was done with nursing staff present in the room.  Scant amount of bright red blood noted in vaginal vault.         ED Course as of 07/01/24 2002 Mon Jul 01, 2024   1704 hCG Qualitative, Urine(!): Positive [AJ]   1845 Lab work shows normal white count of 6.9.  Hemoglobin is stable.  Chemistries reveal normal liver and kidney function. [AJ]   1846 Urinalysis shows 12 WBC, 6 RBC, many bacteria with 3 squamous cell.  Will treat for asymptomatic bacteria given pregnancy status [AJ]   1846 Beta HCG Quant: 24215  No previous beta-hCG for comparison [AJ]   1846 US OB <14 Wks TransAbd & TransVag, Single Gestation (XPD)  FINDINGS:  The uterus measures 10 x 7 x 7 cm. Uterine parenchyma is heterogenous in echotexture. In the uterine cavity there is a gestational sac with a mean diameter of  1.5 cm which would correspond with estimated gestational age of 6 weeks 3 days.  No fetal pole is seen.  The yolk sac measures 0.3 cm.  Small amount of adjacent subchorionic hemorrhage is seen.     The right ovary measures 4 x 2 x 4 cm. The left ovary measures 3 x 2 x 4 cm. Arterial and venous flow are preserved bilaterally.  No adnexal abnormalities are seen.  No significant free fluid is seen.     Impression:     Pregnancy of unknown viability.  Small intrauterine gestational sac is seen which would correspond with estimated gestational age of 6 weeks 3 days, although no fetal pole is identified at this time.  Recommend serial beta HCGs and repeat pelvic ultrasound in 1-2 weeks or as clinically indicated.      [AJ]   1910 Pelvic examination reveals trace bleeding from the cervical os, os is closed.    Given patient's blood type of A negative with vaginal bleeding, she was given RHOGAM in the ED [AJ]   1914 Discussed case with OBGYN on-call for close follow-up.    OBGYN resident states that patient will need repeat ultrasound and they will order this.  Patient has been seen in the resident Clinic in the past. [AJ]   2000 Patient agreeable to close follow-up with OBGYN.    She was given strict return precautions ED including but not limited to worsening pelvic pain, vaginal bleeding requiring 1 pad per hour, fever or any other concerning findings. [AJ]   2000 She is otherwise stable for discharge and close follow-up [AJ]      ED Course User Index  [AJ] Lacey Loving PA-C                           Clinical Impression:  Final diagnoses:  [O46.90] Vaginal bleeding in pregnancy (Primary)  [R82.71] Asymptomatic bacteriuria  [O36.80X0] Pregnancy with inconclusive fetal viability, single or unspecified fetus          ED Disposition Condition    Discharge Stable          ED Prescriptions       Medication Sig Dispense Start Date End Date Auth. Provider    nitrofurantoin, macrocrystal-monohydrate, (MACROBID) 100 MG  capsule  (Status: Discontinued) Take 1 capsule (100 mg total) by mouth 2 (two) times daily. for 5 days 10 capsule 7/1/2024 7/1/2024 Lacey Loving PA-C    nitrofurantoin, macrocrystal-monohydrate, (MACROBID) 100 MG capsule Take 1 capsule (100 mg total) by mouth 2 (two) times daily. for 5 days 10 capsule 7/1/2024 7/6/2024 Lacey Loving PA-C          Follow-up Information       Follow up With Specialties Details Why Contact Info Additional Information    [facility]ne[facility]   [address] [facility] [address]  [facility] [address] [phone] [facility] - [facility], 1st Floor [facility] in [facility]    [facility]   [address] [facility] [address]  [facility] [id number] [phone] Turn at Entrance 1 on [facility] in [facility] and take elevators to Floor 2. Follow signs to [facility]. Take [place] Elevators to Floor 4 for [address].             Lacey Loving PA-C  07/01/24 2002

## 2024-07-02 ENCOUNTER — PATIENT MESSAGE (OUTPATIENT)
Dept: MATERNAL FETAL MEDICINE | Facility: CLINIC | Age: 29
End: 2024-07-02
Payer: MEDICAID

## 2024-07-02 NOTE — DISCHARGE INSTRUCTIONS
Follow-up with OBGYN office tomorrow morning.  You will need close follow-up for repeat ultrasound and further blood work.    Return emergency room immediately you are having vaginal bleeding and you are saturating 1 pad per hour, severe pelvic pain,  if you spike a fever or any concerning symptoms.    You may take Tylenol for pain if needed.  Finish full course of antibiotics provided to you.

## 2024-07-03 LAB — BACTERIA UR CULT: ABNORMAL

## 2024-07-19 ENCOUNTER — HOSPITAL ENCOUNTER (EMERGENCY)
Facility: HOSPITAL | Age: 29
Discharge: HOME | End: 2024-07-19
Attending: EMERGENCY MEDICINE
Payer: MEDICAID

## 2024-07-19 VITALS
SYSTOLIC BLOOD PRESSURE: 136 MMHG | HEIGHT: 63 IN | OXYGEN SATURATION: 99 % | TEMPERATURE: 99 F | DIASTOLIC BLOOD PRESSURE: 93 MMHG | BODY MASS INDEX: 32.25 KG/M2 | HEART RATE: 76 BPM | WEIGHT: 182 LBS | RESPIRATION RATE: 18 BRPM

## 2024-07-19 DIAGNOSIS — O03.9 MISCARRIAGE: Primary | ICD-10-CM

## 2024-07-19 LAB
ABO + RH BLD: NORMAL
ALBUMIN SERPL BCP-MCNC: 4.2 G/DL (ref 3.5–5.2)
ALP SERPL-CCNC: 41 U/L (ref 55–135)
ALT SERPL W/O P-5'-P-CCNC: 18 U/L (ref 10–44)
ANION GAP SERPL CALC-SCNC: 12 MMOL/L (ref 8–16)
AST SERPL-CCNC: 16 U/L (ref 10–40)
BACTERIA #/AREA URNS AUTO: ABNORMAL /HPF
BACTERIA GENITAL QL WET PREP: ABNORMAL
BASOPHILS # BLD AUTO: 0.04 K/UL (ref 0–0.2)
BASOPHILS NFR BLD: 0.7 % (ref 0–1.9)
BILIRUB SERPL-MCNC: 0.6 MG/DL (ref 0.1–1)
BILIRUB UR QL STRIP: NEGATIVE
BUN SERPL-MCNC: 11 MG/DL (ref 6–20)
CALCIUM SERPL-MCNC: 10.3 MG/DL (ref 8.7–10.5)
CHLORIDE SERPL-SCNC: 107 MMOL/L (ref 95–110)
CLARITY UR REFRACT.AUTO: CLEAR
CLUE CELLS VAG QL WET PREP: ABNORMAL
CO2 SERPL-SCNC: 18 MMOL/L (ref 23–29)
COLOR UR AUTO: YELLOW
CREAT SERPL-MCNC: 0.9 MG/DL (ref 0.5–1.4)
DIFFERENTIAL METHOD BLD: ABNORMAL
EOSINOPHIL # BLD AUTO: 0.1 K/UL (ref 0–0.5)
EOSINOPHIL NFR BLD: 1.2 % (ref 0–8)
ERYTHROCYTE [DISTWIDTH] IN BLOOD BY AUTOMATED COUNT: 12.2 % (ref 11.5–14.5)
EST. GFR  (NO RACE VARIABLE): >60 ML/MIN/1.73 M^2
FILAMENT FUNGI VAG WET PREP-#/AREA: ABNORMAL
GLUCOSE SERPL-MCNC: 87 MG/DL (ref 70–110)
GLUCOSE UR QL STRIP: NEGATIVE
HCG INTACT+B SERPL-ACNC: NORMAL MIU/ML
HCT VFR BLD AUTO: 40.4 % (ref 37–48.5)
HGB BLD-MCNC: 14 G/DL (ref 12–16)
HGB UR QL STRIP: ABNORMAL
HYALINE CASTS UR QL AUTO: 0 /LPF
IMM GRANULOCYTES # BLD AUTO: 0.01 K/UL (ref 0–0.04)
IMM GRANULOCYTES NFR BLD AUTO: 0.2 % (ref 0–0.5)
KETONES UR QL STRIP: ABNORMAL
LEUKOCYTE ESTERASE UR QL STRIP: ABNORMAL
LYMPHOCYTES # BLD AUTO: 2.3 K/UL (ref 1–4.8)
LYMPHOCYTES NFR BLD: 39.5 % (ref 18–48)
MCH RBC QN AUTO: 31.7 PG (ref 27–31)
MCHC RBC AUTO-ENTMCNC: 34.7 G/DL (ref 32–36)
MCV RBC AUTO: 91 FL (ref 82–98)
MICROSCOPIC COMMENT: ABNORMAL
MONOCYTES # BLD AUTO: 0.5 K/UL (ref 0.3–1)
MONOCYTES NFR BLD: 7.8 % (ref 4–15)
NEUTROPHILS # BLD AUTO: 3 K/UL (ref 1.8–7.7)
NEUTROPHILS NFR BLD: 50.6 % (ref 38–73)
NITRITE UR QL STRIP: NEGATIVE
NRBC BLD-RTO: 0 /100 WBC
PH UR STRIP: 6 [PH] (ref 5–8)
PLATELET # BLD AUTO: 261 K/UL (ref 150–450)
PMV BLD AUTO: 10.5 FL (ref 9.2–12.9)
POTASSIUM SERPL-SCNC: 3.6 MMOL/L (ref 3.5–5.1)
PROT SERPL-MCNC: 8.5 G/DL (ref 6–8.4)
PROT UR QL STRIP: ABNORMAL
RBC # BLD AUTO: 4.42 M/UL (ref 4–5.4)
RBC #/AREA URNS AUTO: 19 /HPF (ref 0–4)
SODIUM SERPL-SCNC: 137 MMOL/L (ref 136–145)
SP GR UR STRIP: >=1.03 (ref 1–1.03)
SPECIMEN SOURCE: ABNORMAL
SQUAMOUS #/AREA URNS AUTO: 4 /HPF
T VAGINALIS GENITAL QL WET PREP: ABNORMAL
URN SPEC COLLECT METH UR: ABNORMAL
WBC # BLD AUTO: 5.88 K/UL (ref 3.9–12.7)
WBC #/AREA URNS AUTO: 5 /HPF (ref 0–5)
WBC #/AREA VAG WET PREP: ABNORMAL
YEAST GENITAL QL WET PREP: ABNORMAL

## 2024-07-19 PROCEDURE — 81001 URINALYSIS AUTO W/SCOPE: CPT | Performed by: EMERGENCY MEDICINE

## 2024-07-19 PROCEDURE — 25000003 PHARM REV CODE 250: Performed by: EMERGENCY MEDICINE

## 2024-07-19 PROCEDURE — 86901 BLOOD TYPING SEROLOGIC RH(D): CPT | Performed by: EMERGENCY MEDICINE

## 2024-07-19 PROCEDURE — 87491 CHLMYD TRACH DNA AMP PROBE: CPT | Performed by: EMERGENCY MEDICINE

## 2024-07-19 PROCEDURE — 84702 CHORIONIC GONADOTROPIN TEST: CPT | Performed by: EMERGENCY MEDICINE

## 2024-07-19 PROCEDURE — 99284 EMERGENCY DEPT VISIT MOD MDM: CPT | Mod: 25

## 2024-07-19 PROCEDURE — 87591 N.GONORRHOEAE DNA AMP PROB: CPT | Performed by: EMERGENCY MEDICINE

## 2024-07-19 PROCEDURE — 80053 COMPREHEN METABOLIC PANEL: CPT | Performed by: EMERGENCY MEDICINE

## 2024-07-19 PROCEDURE — 85025 COMPLETE CBC W/AUTO DIFF WBC: CPT | Performed by: EMERGENCY MEDICINE

## 2024-07-19 PROCEDURE — 87210 SMEAR WET MOUNT SALINE/INK: CPT | Performed by: EMERGENCY MEDICINE

## 2024-07-19 RX ORDER — IBUPROFEN 600 MG/1
600 TABLET ORAL EVERY 6 HOURS PRN
Qty: 20 TABLET | Refills: 0 | Status: SHIPPED | OUTPATIENT
Start: 2024-07-19

## 2024-07-19 RX ORDER — ONDANSETRON 4 MG/1
4 TABLET, ORALLY DISINTEGRATING ORAL EVERY 6 HOURS PRN
Qty: 12 TABLET | Refills: 0 | Status: SHIPPED | OUTPATIENT
Start: 2024-07-19 | End: 2024-07-21

## 2024-07-19 RX ORDER — MISOPROSTOL 200 UG/1
800 TABLET ORAL EVERY 12 HOURS
Qty: 8 TABLET | Refills: 0 | Status: SHIPPED | OUTPATIENT
Start: 2024-07-19 | End: 2024-07-20

## 2024-07-19 RX ORDER — ACETAMINOPHEN 325 MG/1
650 TABLET ORAL
Status: COMPLETED | OUTPATIENT
Start: 2024-07-19 | End: 2024-07-19

## 2024-07-19 RX ORDER — HYDROCODONE BITARTRATE AND ACETAMINOPHEN 5; 325 MG/1; MG/1
1 TABLET ORAL EVERY 6 HOURS PRN
Qty: 4 TABLET | Refills: 0 | Status: SHIPPED | OUTPATIENT
Start: 2024-07-19

## 2024-07-19 RX ADMIN — ACETAMINOPHEN 650 MG: 325 TABLET ORAL at 11:07

## 2024-07-19 NOTE — ED PROVIDER NOTES
Encounter Date: 2024       History     Chief Complaint   Patient presents with    Vaginal Bleeding     9 weeks preg missed ultrasound on the 15 th, spotting , using panty liners     29 yo W,  at approx 10-4 wga by LMP (24) with a past medical history of asthma, Rh negative status presents with a chief complaint of vaginal spotting.  She notes she has been suffering from vaginal spotting for the past 4 weeks.  She was last seen in the ER almost 3 weeks ago on  at which time ultrasound revealed pregnancy of unknown viability.  Small intrauterine gestational sac is seen which would correspond with estimated gestational age of 6 weeks 3 days, although no fetal pole is identified at this time.  Recommend serial beta HCGs and repeat pelvic ultrasound in 1-2 weeks or as clinically indicated.  She received RhoGAM at that time given Rh negative status.  She was discharged to follow-up with OB.  She was scheduled for an ultrasound earlier this week but missed the appointment.  Patient notes that the spotting has been steady and she wears panty liners but has not saturated knee pads.  She notes that over the past 2-3 days, she has noticed small clots in the liners.  No passage of tissue or liver like material.  No abdominal pain.  Mild lightheadedness today.      The history is provided by the patient.     Review of patient's allergies indicates:   Allergen Reactions    Iodinated contrast media Hives     Past Medical History:   Diagnosis Date    Asthma     Heart murmur of       Past Surgical History:   Procedure Laterality Date    TONSILLECTOMY      partial tionsillectomy     Family History   Problem Relation Name Age of Onset    Breast cancer Neg Hx      Colon cancer Neg Hx      Ovarian cancer Neg Hx       Social History     Tobacco Use    Smoking status: Former     Types: Cigarettes    Smokeless tobacco: Never   Substance Use Topics    Alcohol use: Yes     Comment: patient drinks every day  sara and daqauri    Drug use: Yes     Types: Marijuana     Review of Systems    Physical Exam     Initial Vitals [07/19/24 1031]   BP Pulse Resp Temp SpO2   121/88 96 18 99 °F (37.2 °C) 100 %      MAP       --         Physical Exam    Nursing note and vitals reviewed.  Constitutional: She appears well-developed and well-nourished. She is not diaphoretic. No distress.   HENT:   Head: Normocephalic.   Cardiovascular:  Normal rate.           Pulmonary/Chest: No stridor. No respiratory distress.   Abdominal: Abdomen is soft. She exhibits no distension. There is no abdominal tenderness. There is no rebound and no guarding.   Genitourinary:    Pelvic exam was performed with patient supine.   Cervix exhibits no motion tenderness, no discharge and no friability. Right adnexum displays no tenderness and no fullness. Left adnexum displays no tenderness and no fullness.    Vaginal bleeding present.   There is bleeding in the vagina.    Genitourinary Comments: Cervix closed, scant bleeding, clears with swab       Neurological: She is alert.   Skin: Skin is warm and dry.   Psychiatric: Thought content normal.         ED Course   Procedures  Labs Reviewed   VAGINAL SCREEN - Abnormal; Notable for the following components:       Result Value    WBC - Vaginal Screen Rare (*)     Bacteria - Vaginal Screen Rare (*)     All other components within normal limits    Narrative:     Release to patient->Immediate   CBC W/ AUTO DIFFERENTIAL - Abnormal; Notable for the following components:    MCH 31.7 (*)     All other components within normal limits    Narrative:     Release to patient->Immediate   COMPREHENSIVE METABOLIC PANEL - Abnormal; Notable for the following components:    CO2 18 (*)     Total Protein 8.5 (*)     Alkaline Phosphatase 41 (*)     All other components within normal limits    Narrative:     Release to patient->Immediate   URINALYSIS, REFLEX TO URINE CULTURE - Abnormal; Notable for the following components:     Specific Gravity, UA >=1.030 (*)     Protein, UA 1+ (*)     Ketones, UA 2+ (*)     Occult Blood UA 3+ (*)     Leukocytes, UA 1+ (*)     All other components within normal limits    Narrative:     Specimen Source->Urine   URINALYSIS MICROSCOPIC - Abnormal; Notable for the following components:    RBC, UA 19 (*)     All other components within normal limits    Narrative:     Specimen Source->Urine   HCG, QUANTITATIVE    Narrative:     Release to patient->Immediate   C. TRACHOMATIS/N. GONORRHOEAE BY AMP DNA   POCT URINE PREGNANCY   GROUP & RH          Imaging Results              US OB <14 Wks TransAbd & TransVag, Single Gestation (XPD) (Final result)  Result time 07/19/24 13:50:42      Final result by Abdirizak Caba MD (07/19/24 13:50:42)                   Impression:      Findings diagnostic of pregnancy failure with intrauterine gestational sac with no fetal pole visualized.  Of note comparison was made with ultrasound from 07/01/2024 which demonstrated in intrauterine gestational sac with yolk sac.  Failure of development of fetal pole with heartbeat within this timeframe is diagnostic of pregnancy failure.    Small subchorionic hemorrhage.      Electronically signed by: Abdirizak Caba MD  Date:    07/19/2024  Time:    13:50               Narrative:    EXAMINATION:  US OB <14 WEEKS, TRANSABDOM & TRANSVAG, SINGLE GESTATION (XPD)    CLINICAL HISTORY:  vag bleed, preg;    TECHNIQUE:  Transabdominal sonography of the pelvis was performed, followed by transvaginal sonography to better evaluate the uterus and ovaries.    COMPARISON:  Ultrasound from 07/01/2024    FINDINGS:  Uterus: Uterus measures 10.8 x 5.8 x 8.3 cm.  Intrauterine fibroid measuring 1.6 x 1.8 x 2.3 cm.  There is a single gestational sac within the fundus of the endometrial canal.  Yolk sac is present.  This digest a shin ule sac demonstrates irregular contours.  No fetal pole is present.  Subchorionic hemorrhage measuring 3.8 x 1.2 x 4.0  cm.    Right ovary: Normal.    Left ovary: Normal.    Miscellaneous: Small volume of free fluid in the cul-de-sac.                                       Medications   acetaminophen tablet 650 mg (650 mg Oral Given 24 1144)     Medical Decision Making  27 yo W,  at approx 10-4 wga by LMP (24) with a past medical history of asthma, Rh negative status presents with a chief complaint of vaginal spotting.    Ddx includes, but is not limited to: pregnancy, ectopic pregnancy, miscarriage, symptomatic anemia    Will obtain repeat labs and ultrasound to assess pregnancy viability.    Reassessment: Vaginal screen clear.  CBC without leukocytosis or anemia.  CMP unremarkable.  Patient was Rh negative but received RhoGAM a few weeks ago, no indication for repeat dosing.  Urinalysis with ketones, protein, blood but no convincing evidence of infection.  Beta-hCG unfortunately is down trending, now 19,000.  Will await ultrasound.    Reassessment:  Ultrasound diagnostic of pregnancy failure with anterior uterine gestational sac without fetal pole.  Compared to previous ultrasound from  that revealed a yolk sac.  Case reviewed with OBGYN on-call who agrees with pregnancy failure.  After discussion with them, as well as patient and her partner, they elect to proceed with Cytotec.  Prescription provided.  Also analgesics, antiemetics.  Extensive return precautions and the importance of OB follow-up.  Patient notes that she has adequate support at home and is comfortable with discharge.    Amount and/or Complexity of Data Reviewed  Labs: ordered.  Radiology: ordered.    Risk  OTC drugs.  Prescription drug management.                                      Clinical Impression:  Final diagnoses:  [O03.9] Miscarriage (Primary)          ED Disposition Condition    Discharge Stable          ED Prescriptions       Medication Sig Dispense Start Date End Date Auth. Provider    miSOPROStoL (CYTOTEC) 200 MCG Tab Place 4  tablets (800 mcg total) vaginally every 12 (twelve) hours. for 2 doses 8 tablet 7/19/2024 7/20/2024 Moris Ward MD    HYDROcodone-acetaminophen (NORCO) 5-325 mg per tablet Take 1 tablet by mouth every 6 (six) hours as needed (severe pain). 4 tablet 7/19/2024 -- Moris Ward MD    ondansetron (ZOFRAN-ODT) 4 MG TbDL Take 1 tablet (4 mg total) by mouth every 6 (six) hours as needed (nausea). 12 tablet 7/19/2024 7/21/2024 Moris Ward MD    ibuprofen (ADVIL,MOTRIN) 600 MG tablet Take 1 tablet (600 mg total) by mouth every 6 (six) hours as needed for Pain. 20 tablet 7/19/2024 -- Moris Ward MD          Follow-up Information       Follow up With Specialties Details Why Contact Info    [facility] Emergency Medicine  As needed, If symptoms worsen [address]  [facility] [address] [phone]             Moris Ward MD  07/19/24 9044

## 2024-07-19 NOTE — DISCHARGE INSTRUCTIONS
As explained, you were having a miscarriage.  You elected to take a medication to encourage this natural process to progress quicker.  Use 800 mcg of misoprostol vaginally.  After few hours, you will have 1-2 hours of severe menstrual bleeding.  Following that it will turn into a normal period.  If you do not pass tissue like material after 12 hours, you may repeat the dose.  Be sure to follow-up with OBGYN for repeat assessment as persistent material in your uterus can be dangerous.    You may take ibuprofen every 6 hours as needed for pain.  Take Zofran as needed for nausea.  If pain is severe, you may use Norco but this is an opiate and will make you drowsy so do not take it before driving. Do not use medication when you are operating heavy machinery, driving, or working because the medication may be sedating. Do not take medication sooner than the frequency as directed.     Return to the ER for any severe bleeding, weakness, lightheadedness, fever, severe abdominal pain, or other concerning symptoms.

## 2024-07-22 LAB
C TRACH DNA SPEC QL NAA+PROBE: NOT DETECTED
N GONORRHOEA DNA SPEC QL NAA+PROBE: NOT DETECTED